# Patient Record
Sex: FEMALE | Race: BLACK OR AFRICAN AMERICAN | Employment: FULL TIME | URBAN - METROPOLITAN AREA
[De-identification: names, ages, dates, MRNs, and addresses within clinical notes are randomized per-mention and may not be internally consistent; named-entity substitution may affect disease eponyms.]

---

## 2017-02-07 ENCOUNTER — OFFICE VISIT (OUTPATIENT)
Dept: ENDOCRINOLOGY | Age: 37
End: 2017-02-07

## 2017-02-07 VITALS
SYSTOLIC BLOOD PRESSURE: 155 MMHG | BODY MASS INDEX: 43.32 KG/M2 | HEART RATE: 74 BPM | WEIGHT: 260 LBS | HEIGHT: 65 IN | DIASTOLIC BLOOD PRESSURE: 103 MMHG

## 2017-02-07 DIAGNOSIS — I10 BENIGN ESSENTIAL HTN: ICD-10-CM

## 2017-02-07 DIAGNOSIS — E11.9 TYPE 2 DIABETES MELLITUS WITHOUT COMPLICATION, WITHOUT LONG-TERM CURRENT USE OF INSULIN (HCC): Primary | ICD-10-CM

## 2017-02-07 RX ORDER — CARVEDILOL 25 MG/1
25 TABLET ORAL 2 TIMES DAILY WITH MEALS
Qty: 60 TAB | Refills: 10 | Status: SHIPPED | OUTPATIENT
Start: 2017-02-07 | End: 2017-05-08

## 2017-02-07 RX ORDER — AMLODIPINE AND VALSARTAN 5; 320 MG/1; MG/1
1 TABLET ORAL
Qty: 30 TAB | Refills: 10 | Status: SHIPPED | OUTPATIENT
Start: 2017-02-07 | End: 2017-05-08

## 2017-02-07 NOTE — PROGRESS NOTES
Chief Complaint   Patient presents with    Diabetes    New Patient     History of Present Illness: Feroz Posadas is a 39 y.o. female presents for evaluation of diabetes. she has had diabetes since 2015 - had gestational DM 3 years ago. Most recent A1c was 13.7 in summer     Complications: none    Current diabetes regimen:  Metformin ER 1000 mg twice daily - tolerating well now    Glucoses:  152 this AM fasting. 105 at noon one day and 112 after dinner    Diet  Was doing a very good job avoiding sugars, was having salads. Then started feeling a little shaky. Did not check glucose with sx. - Breakfast: coffee, sausage and egg with cheese biscuit  - Lunch: Subway or [employer] sandwich  - Dinner: salmon, shrimp, small amt of rice and mashed potatoes last night (leftovers from several nights).  'anything' - spaghetti, pizza   - Beverages:water, soda, diet beverages. - Snacks: chocolate chip cookies and bowl of cereal.     Exercise: none  Weight was as low as 190 at one point in her adult life when she was exercising often and eating better. 260 lb now. HTN:  Not controlled. Did not take BP medications yet this morning. Losartan/HCTZ 100/12.5, atenolol 100 mg. Social:  Has a 2 yo son. 1.5 hour commute each way. Works in [place] - works as an . Has a gym in her new office. Past Medical History   Diagnosis Date    Advanced care planning/counseling discussion 16    Diabetes ([address] 2015     A1C 15    Gestational diabetes     HTN (hypertension)     Hyperlipidemia 2015    Migraines      Past Surgical History   Procedure Laterality Date    Hx wisdom teeth extraction      Hx  section  2014    Hx gyn       Implant - Nexplanon     Current Outpatient Prescriptions   Medication Sig    metFORMIN ER (GLUCOPHAGE XR) 500 mg tablet One daily by mouth x 1 week, then 2 daily x 1wk, then 3 daily x 1 wk, up to 4 daily for diabetes.  (Patient taking differently: 1,000 mg two (2) times a day. One daily by mouth x 1 week, then 2 daily x 1wk, then 3 daily x 1 wk, up to 4 daily for diabetes.)    Lancets misc Please dispensed what is approved by her insurance company    Blood-Glucose Meter monitoring kit For up to 3 times a day glucose checks. One touch ultra II    glucose blood VI test strips (ASCENSIA AUTODISC VI, ONE TOUCH ULTRA TEST VI) strip For three times a day glucose checks. One Touch Ultra II    Insulin Needles, Disposable, 31 gauge x 5/16\" ndle For use with insulin pen    losartan-hydrochlorothiazide (HYZAAR) 100-12.5 mg per tablet Take 1 Tab by mouth daily.  atenolol (TENORMIN) 100 mg tablet Take 1 Tab by mouth daily. No current facility-administered medications for this visit. Allergies   Allergen Reactions    Ace Inhibitors Hives     Family History   Problem Relation Age of Onset    Diabetes Mother 39     on insulin now    Hypertension Father     High Cholesterol Father     Diabetes Sister 32     type 2    Diabetes Maternal Grandmother     Other Maternal Grandfather      MVA    Diabetes Paternal Grandmother     Cancer Paternal Grandfather      unknown     Social History     Social History    Marital status: SINGLE     Spouse name: N/A    Number of children: N/A    Years of education: N/A     Occupational History    Not on file.      Social History Main Topics    Smoking status: Current Every Day Smoker     Packs/day: 0.25    Smokeless tobacco: Never Used      Comment: reports she is cutting back    Alcohol use No    Drug use: No    Sexual activity: No     Other Topics Concern    Not on file     Social History Narrative     Review of Systems:  - Constitutional Symptoms: no fevers, chills, see HPI  - Eyes: no blurry vision or double vision  - Cardiovascular: no chest pain or palpitations  - Respiratory: no cough or shortness of breath  - Gastrointestinal: no dysphagia or abdominal pain  - Musculoskeletal: no joint pains or weakness  - Integumentary: no rashes  - Neurological: no numbness, tingling, or headaches  - Psychiatric: no depression or anxiety  - Endocrine: no heat or cold intolerance, no polyuria or polydipsia    Physical Examination:  Visit Vitals    BP (!) 155/103    Pulse 74    Ht 5' 5\" (1.651 m)    Wt 260 lb (117.9 kg)    BMI 43.27 kg/m2   -   - General: pleasant, no distress,   - HEENT:No scleral/conjunctival injection, EOMI,MMM  - Cardiovascular: regular, normal rate  - Respiratory: normal effort  - Integumentary: no edema  - Neurological: alert and oriented  - Psychiatric: normal mood and affect    Data Reviewed:   Component      Latest Ref Rng & Units 6/1/2016 10/26/2015 2/16/2015 2/16/2015           2:30 PM  4:03 PM  9:18 AM  9:18 AM   Glucose      65 - 99 mg/dL    114 (H)   BUN      6 - 20 mg/dL    12   Creatinine      0.57 - 1.00 mg/dL    0.69   GFR est non-AA      >59 mL/min/1.73    114   GFR est AA      >59 mL/min/1.73    131   BUN/Creatinine ratio      8 - 20    17   Sodium      134 - 144 mmol/L    138   Potassium      3.5 - 5.2 mmol/L    4.2   Chloride      97 - 108 mmol/L    100   CO2      18 - 29 mmol/L    26   Calcium      8.7 - 10.2 mg/dL    9.8   Protein, total      6.0 - 8.5 g/dL    6.7   Albumin      3.5 - 5.5 g/dL    4.4   GLOBULIN, TOTAL      1.5 - 4.5 g/dL    2.3   A-G Ratio      1.1 - 2.5    1.9   Bilirubin, total      0.0 - 1.2 mg/dL    0.4   Alk. phosphatase      39 - 117 IU/L    66   AST      0 - 40 IU/L    11   ALT      0 - 32 IU/L    13   Cholesterol, total      100 - 199 mg/dL   186    Triglyceride      0 - 149 mg/dL   102    HDL Cholesterol      >39 mg/dL   44    VLDL, calculated      5 - 40 mg/dL   20    LDL, calculated      0 - 99 mg/dL   122 (H)    Hemoglobin A1c, (calculated)      4.8 - 5.6 %  6.1 (H)     Hemoglobin A1c (POC)      % 13.7        Assessment/Plan:   1.  Type 2 diabetes mellitus without complication, without long-term current use of insulin [facility]   - Reviewed pathology of type II diabetes, including insulin resistance and progressive loss of beta cell function and insulin production with time. Explained the role of weight loss and limiting carbohydrate intake in affecting insulin needs. Reviewed basal and prandial insulin needs. Reviewed handout and gave basic directions on carbohydrate content of foods. Recommended limiting carbohydrate intake to < 45 g with meals. Encouraged exercise - 30 min 5 x weekly. - encouraged wt loss  - continue metformin ER  - encouraged her to avoid sugared beverages and work on snacking in evening as main areas for improvement  - she will also try to incorporate some exercise into her schedule. 2. Benign essential HTN   - prefer carvedilol over atenolol in diabetic patients and for BP, in general due to better metabolic effects and vasodilating properties. Stop atenolol and start carvedilol 25 mg BID. Reviewed chance for dizziness. May taper off carvedilol in future. - change losartan/HCTZ to amlodipine/valsartan on basis of ACCOMPLISH trial NE 2008. (ACE-I + amlodipine was superiore to ACE-I + HCTZ ). Start amlodipine/valsartan 5/320 mg and take at bedtime   3. BMI 40.0-44.9, adult ([address]   -  Encouraged improved dietary habits with lower glucose and carb intake. Avoid liquid calories, avoid snacking at night, decrease carb intake and aim for lean proteins and vegetables. - reviewed benefit for insulin sensitivity  - explained how energy, mood, sleep would all likely improve with wt loss     Greater than 50% of 45 minute visit was spent counseling the patient about above. Patient Instructions   Diabetes. Watch carbohydrate intake with meals and aim to keep this less than 45 grams per meal.    A Mediterranean style diet with 55% or less of calories from carbohydrates has been shown to be very helpful for people with diabetes.  This diet consists of vegetables, whole fruit, nuts, whole grains, beans, lentils, olive oil, fish, chicken, and less refined grains, red and processed meats. Exercise: work up to 30 minutes 5 days weekly of walking, or any other activity that gets your heart rate up. Medications:  Continue metformin  -1000 mg twice daily    Work on efforts to lose weight and increase exercise. Blood pressure:  Change atenolol to carvedilol 25 mg twice daily    Change losartan 100/12.5 to amlodipine/valsartan 5/320 mg - best taken in evening          Follow-up Disposition:  Return in about 3 months (around 5/7/2017).

## 2017-02-07 NOTE — PATIENT INSTRUCTIONS
Diabetes. Watch carbohydrate intake with meals and aim to keep this less than 45 grams per meal.    A Mediterranean style diet with 55% or less of calories from carbohydrates has been shown to be very helpful for people with diabetes. This diet consists of vegetables, whole fruit, nuts, whole grains, beans, lentils, olive oil, fish, chicken, and less refined grains, red and processed meats. Exercise: work up to 30 minutes 5 days weekly of walking, or any other activity that gets your heart rate up. Medications:  Continue metformin  -1000 mg twice daily    Work on efforts to lose weight and increase exercise.       Blood pressure:  Change atenolol to carvedilol 25 mg twice daily    Change losartan 100/12.5 to amlodipine/valsartan 5/320 mg - best taken in evening

## 2017-02-07 NOTE — MR AVS SNAPSHOT
Visit Information Date & Time Provider Department Dept. Phone Encounter #  
 2/7/2017  8:50 AM Denis Travis, [address] [place] [address] Diabetes and Endocrinology [phone] [id number] Upcoming Health Maintenance Date Due MICROALBUMIN Q1 8/12/1990 EYE EXAM RETINAL OR DILATED Q1 8/12/1990 LIPID PANEL Q1 2/16/2016 INFLUENZA AGE 9 TO ADULT 8/1/2016 HEMOGLOBIN A1C Q6M 12/1/2016 Pneumococcal 19-64 Medium Risk (1 of 1 - PPSV23) 6/1/2017* FOOT EXAM Q1 6/22/2017 PAP AKA CERVICAL CYTOLOGY 2/16/2018 DTaP/Tdap/Td series (2 - Td) 12/10/2023 *Topic was postponed. The date shown is not the original due date. Allergies as of 2/7/2017  Review Complete On: 2/7/2017 By: Denis Travis MD  
  
 Severity Noted Reaction Type Reactions Ace Inhibitors  04/07/2010    Hives Current Immunizations  Reviewed on 6/1/2016 Name Date Tdap 12/10/2013 Not reviewed this visit You Were Diagnosed With   
  
 Codes Comments Type 2 diabetes mellitus without complication, without long-term current use of insulin (HCC)    -  Primary ICD-10-CM: E11.9 ICD-9-CM: 250.00 Benign essential HTN     ICD-10-CM: I10 
ICD-9-CM: 401.1 BMI 40.0-44.9, adult [facility])     ICD-10-CM: Z68.41 
ICD-9-CM: V85.41 Vitals BP Pulse Height(growth percentile) Weight(growth percentile) BMI OB Status (!) 155/103 74 5' 5\" (1.651 m) 260 lb (117.9 kg) 43.27 kg/m2 Having regular periods Smoking Status Current Every Day Smoker Vitals History BMI and BSA Data Body Mass Index Body Surface Area  
 43.27 kg/m 2 2.33 m 2 Preferred Pharmacy Pharmacy Name Phone [id number] [address] [phone] Your Updated Medication List  
  
   
This list is accurate as of: 2/7/17 10:11 AM.  Always use your most recent med list. amLODIPine-valsartan 5-320 mg per tablet Commonly known as:  Harsh Quinones  
 Take 1 Tab by mouth nightly. Blood-Glucose Meter monitoring kit For up to 3 times a day glucose checks. One touch ultra II  
  
 carvedilol 25 mg tablet Commonly known as:  Lisa Pee Take 1 Tab by mouth two (2) times daily (with meals). Replaces atenolol  
  
 glucose blood VI test strips strip Commonly known as:  ASCENSIA AUTODISC VI, ONE TOUCH ULTRA TEST VI For three times a day glucose checks. One Touch Ultra II Insulin Needles (Disposable) 31 gauge x 5/16\" Ndle For use with insulin pen Lancets Misc Please dispensed what is approved by her insurance company  
  
 metFORMIN  mg tablet Commonly known as:  GLUCOPHAGE XR One daily by mouth x 1 week, then 2 daily x 1wk, then 3 daily x 1 wk, up to 4 daily for diabetes. Prescriptions Sent to Pharmacy Refills  
 amLODIPine-valsartan (EXFORGE) 5-320 mg per tablet 10 Sig: Take 1 Tab by mouth nightly. Class: Normal  
 Pharmacy: [facility] Ph #: [phone] Route: Oral  
 carvedilol (COREG) 25 mg tablet 10 Sig: Take 1 Tab by mouth two (2) times daily (with meals). Replaces atenolol Class: Normal  
 Pharmacy: [facility] Ph #: [phone] Route: Oral  
  
We Performed the Following HEMOGLOBIN A1C WITH EAG [33728 CPT(R)] METABOLIC PANEL, COMPREHENSIVE [43254 CPT(R)] MICROALBUMIN, UR, RAND W/ MICROALBUMIN/CREA RATIO [id number] CPT(R)] TSH 3RD GENERATION [59308 CPT(R)] Patient Instructions Diabetes. Watch carbohydrate intake with meals and aim to keep this less than 45 grams per meal. 
 
A Mediterranean style diet with 55% or less of calories from carbohydrates has been shown to be very helpful for people with diabetes.  This diet consists of vegetables, whole fruit, nuts, whole grains, beans, lentils, olive oil, fish, chicken, and less refined grains, red and processed meats.  
 
Exercise: work up to 30 minutes 5 days weekly of walking, or any other activity that gets your heart rate up. Medications: 
Continue metformin  -1000 mg twice daily Work on efforts to lose weight and increase exercise. Blood pressure: 
Change atenolol to carvedilol 25 mg twice daily Change losartan 100/12.5 to amlodipine/valsartan 5/320 mg - best taken in evening Introducing [facility] SERVICES! Manuel Nicholson introduces [employer] patient portal. Now you can access parts of your medical record, email your doctor's office, and request medication refills online. 1. In your internet browser, go to [email or web address] 2. Click on the First Time User? Click Here link in the Sign In box. You will see the New Member Sign Up page. 3. Enter your [employer] Access Code exactly as it appears below. You will not need to use this code after youve completed the sign-up process. If you do not sign up before the expiration date, you must request a new code. · [employer] Access Code: [email or web address] Expires: 5/8/2017 10:11 AM 
 
4. Enter the last four digits of your Social Security Number (xxxx) and Date of Birth (mm/dd/yyyy) as indicated and click Submit. You will be taken to the next sign-up page. 5. Create a [employer] ID. This will be your [employer] login ID and cannot be changed, so think of one that is secure and easy to remember. 6. Create a [employer] password. You can change your password at any time. 7. Enter your Password Reset Question and Answer. This can be used at a later time if you forget your password. 8. Enter your e-mail address. You will receive e-mail notification when new information is available in [address] 9. Click Sign Up. You can now view and download portions of your medical record. 10. Click the Download Summary menu link to download a portable copy of your medical information. If you have questions, please visit the Frequently Asked Questions section of the [employer] website. Remember, [address] is NOT to be used for urgent needs. For medical emergencies, dial 911. Now available from your iPhone and Android! Please provide this summary of care documentation to your next provider. Your primary care clinician is listed as John Simpson. If you have any questions after today's visit, please call [phone].

## 2017-02-09 LAB
ALBUMIN SERPL-MCNC: 4.6 G/DL (ref 3.5–5.5)
ALBUMIN/CREAT UR: 24.6 MG/G CREAT (ref 0–30)
ALBUMIN/GLOB SERPL: 1.6 {RATIO} (ref 1.1–2.5)
ALP SERPL-CCNC: 53 IU/L (ref 39–117)
ALT SERPL-CCNC: 26 IU/L (ref 0–32)
AST SERPL-CCNC: 18 IU/L (ref 0–40)
BILIRUB SERPL-MCNC: 0.3 MG/DL (ref 0–1.2)
BUN SERPL-MCNC: 11 MG/DL (ref 6–20)
BUN/CREAT SERPL: 16 (ref 8–20)
CALCIUM SERPL-MCNC: 9.9 MG/DL (ref 8.7–10.2)
CHLORIDE SERPL-SCNC: 102 MMOL/L (ref 96–106)
CO2 SERPL-SCNC: 21 MMOL/L (ref 18–29)
CREAT SERPL-MCNC: 0.67 MG/DL (ref 0.57–1)
CREAT UR-MCNC: 200 MG/DL
EST. AVERAGE GLUCOSE BLD GHB EST-MCNC: 117 MG/DL
GLOBULIN SER CALC-MCNC: 2.8 G/DL (ref 1.5–4.5)
GLUCOSE SERPL-MCNC: 99 MG/DL (ref 65–99)
HBA1C MFR BLD: 5.7 % (ref 4.8–5.6)
MICROALBUMIN UR-MCNC: 49.1 UG/ML
POTASSIUM SERPL-SCNC: 4.2 MMOL/L (ref 3.5–5.2)
PROT SERPL-MCNC: 7.4 G/DL (ref 6–8.5)
SODIUM SERPL-SCNC: 140 MMOL/L (ref 134–144)
TSH SERPL DL<=0.005 MIU/L-ACNC: 0.82 UIU/ML (ref 0.45–4.5)

## 2017-02-11 NOTE — PROGRESS NOTES
Will send mychart message. Labs overall look very good. Will encourage efforts with diet and weight loss. Kwesi Castillo,  Please call and mail lab letter. Thank you. Please mail pre-labs - A1c, lipid profile to be done prior to next visit.

## 2017-02-21 ENCOUNTER — TELEPHONE (OUTPATIENT)
Dept: ENDOCRINOLOGY | Age: 37
End: 2017-02-21

## 2017-02-21 NOTE — TELEPHONE ENCOUNTER
----- Message from Consuelo Alanis sent at 2/21/2017  8:46 AM EST -----  Regarding: FW: Louie/telephone  2/21/2017  8:46 AM    Good Morning [facility]! Please see message below! Thanks Vandana Pereira.  ----- Message -----     From: Jackieann Anderson     Sent: 2/21/2017   8:18 AM       To: [facility] Office Pool  Subject: Louie/telephone                                   Pt stated she started a new BP medication and her chest hurts on the right side. She is not sure if she should take the medication today. Pts number is [phone]. She stated she did not take the medication and she feels better.

## 2017-02-21 NOTE — TELEPHONE ENCOUNTER
I called patient and read Dr. Brasher Session recommendation and she voiced understanding. Patient stated she will decrease to 1/2 tablet of amlodipine/valsartan and will call us back with an update of her BP and heart rate.

## 2017-02-21 NOTE — TELEPHONE ENCOUNTER
I returned patient's call. She called to report right sided chest pain that started when she began taking amlodipine/valsartan. She takes amlodipine/valsartan in the evening, and carvedilol twice daily. She denies shortness of breath, cough, swelling. She has not checked her blood pressure. She would like to discontinue amlodipine/valsartan and resume losartan. Please advise.

## 2017-05-08 ENCOUNTER — OFFICE VISIT (OUTPATIENT)
Dept: ENDOCRINOLOGY | Age: 37
End: 2017-05-08

## 2017-05-08 VITALS
BODY MASS INDEX: 43.49 KG/M2 | HEART RATE: 88 BPM | SYSTOLIC BLOOD PRESSURE: 145 MMHG | DIASTOLIC BLOOD PRESSURE: 75 MMHG | WEIGHT: 261 LBS | HEIGHT: 65 IN

## 2017-05-08 DIAGNOSIS — I10 HTN (HYPERTENSION), BENIGN: ICD-10-CM

## 2017-05-08 LAB — GLUCOSE POC: 225 MG/DL

## 2017-05-08 RX ORDER — LOSARTAN POTASSIUM AND HYDROCHLOROTHIAZIDE 12.5; 1 MG/1; MG/1
1 TABLET ORAL DAILY
COMMUNITY
End: 2017-05-08 | Stop reason: ALTCHOICE

## 2017-05-08 RX ORDER — AMLODIPINE AND VALSARTAN 5; 320 MG/1; MG/1
1 TABLET ORAL DAILY
Qty: 30 TAB | Refills: 10 | Status: SHIPPED | OUTPATIENT
Start: 2017-05-08 | End: 2018-01-18 | Stop reason: SDUPTHER

## 2017-05-08 RX ORDER — ATENOLOL 100 MG/1
100 TABLET ORAL DAILY
COMMUNITY
End: 2017-05-09 | Stop reason: SDUPTHER

## 2017-05-08 NOTE — PATIENT INSTRUCTIONS
Diabetes. Continue efforts to decrease snacking in particular  Work on efforts to decrease processed food intake and eat more vegetables, beans/lentils, nuts, fruit, fish, chicken, etc    Medications:  Continue metformin  -1000 mg twice daily    Work on efforts to lose weight and increase exercise.       Blood pressure:  Continue atenolol 100 mg -   Change losartan/HCTZ to valsartan/amlodipine 320/5 mg daily    Weight:  - dietary and exercise efforts  - Belviq 10 mg in AM and 10 mg in mid afternoon

## 2017-05-08 NOTE — MR AVS SNAPSHOT
Visit Information Date & Time Provider Department Dept. Phone Encounter #  
 5/8/2017  9:10 AM Lei Cuellar, [address] [facility] Diabetes and Endocrinology [phone] Follow-up Instructions Return in about 3 months (around 8/8/2017). Upcoming Health Maintenance Date Due  
 EYE EXAM RETINAL OR DILATED Q1 8/12/1990 LIPID PANEL Q1 2/16/2016 Pneumococcal 19-64 Medium Risk (1 of 1 - PPSV23) 6/1/2017* FOOT EXAM Q1 6/22/2017 INFLUENZA AGE 9 TO ADULT 8/1/2017 HEMOGLOBIN A1C Q6M 8/7/2017 MICROALBUMIN Q1 2/7/2018 PAP AKA CERVICAL CYTOLOGY 2/16/2018 DTaP/Tdap/Td series (2 - Td) 12/10/2023 *Topic was postponed. The date shown is not the original due date. Allergies as of 5/8/2017  Review Complete On: 5/8/2017 By: Lei Cuellar MD  
  
 Severity Noted Reaction Type Reactions Ace Inhibitors  04/07/2010    Hives Current Immunizations  Reviewed on 6/1/2016 Name Date Tdap 12/10/2013 Not reviewed this visit You Were Diagnosed With   
  
 Codes Comments Uncontrolled diabetes mellitus type 2 without complications, unspecified long term insulin use status [facility]    -  Primary ICD-10-CM: E11.65 ICD-9-CM: 250.02 BMI 40.0-44.9, adult (HCC)     ICD-10-CM: Z68.41 
ICD-9-CM: V85.41   
 HTN (hypertension), benign     ICD-10-CM: I10 
ICD-9-CM: 401.1 Vitals BP Pulse Height(growth percentile) Weight(growth percentile) BMI OB Status 145/75 88 5' 5\" (1.651 m) 261 lb (118.4 kg) 43.43 kg/m2 Having regular periods Smoking Status Current Every Day Smoker Vitals History BMI and BSA Data Body Mass Index Body Surface Area  
 43.43 kg/m 2 2.33 m 2 Preferred Pharmacy Pharmacy Name Phone [id number] Antonina White, [address] [facility] [phone] Your Updated Medication List  
  
   
This list is accurate as of: 5/8/17 10:22 AM.  Always use your most recent med list.  
  
  
  
  
 atenolol 100 mg tablet Commonly known as:  TENORMIN Take 100 mg by mouth daily. Blood-Glucose Meter monitoring kit For up to 3 times a day glucose checks. One touch ultra II  
  
 glucose blood VI test strips strip Commonly known as:  ASCENSIA AUTODISC VI, ONE TOUCH ULTRA TEST VI For three times a day glucose checks. One Touch Ultra II Insulin Needles (Disposable) 31 gauge x 5/16\" Ndle For use with insulin pen Lancets Misc Please dispensed what is approved by her insurance company  
  
 lorcaserin 10 mg Tab Commonly known as:  Huber Ulises Take 10 mg by mouth two (2) times a day. Max Daily Amount: 20 mg.  
  
 losartan-hydroCHLOROthiazide 100-12.5 mg per tablet Commonly known as:  HYZAAR Take 1 Tab by mouth daily. metFORMIN  mg tablet Commonly known as:  GLUCOPHAGE XR One daily by mouth x 1 week, then 2 daily x 1wk, then 3 daily x 1 wk, up to 4 daily for diabetes. Prescriptions Printed Refills  
 lorcaserin (BELVIQ) 10 mg tab 5 Sig: Take 10 mg by mouth two (2) times a day. Max Daily Amount: 20 mg.  
 Class: Print Route: Oral  
  
We Performed the Following AMB POC GLUCOSE, QUANTITATIVE, BLOOD [46537 CPT(R)] HEMOGLOBIN A1C WITH EAG [69214 CPT(R)] LIPID PANEL [15113 CPT(R)] METABOLIC PANEL, COMPREHENSIVE [62910 CPT(R)] Follow-up Instructions Return in about 3 months (around 8/8/2017). Patient Instructions Diabetes. Continue efforts to decrease snacking in particular Work on efforts to decrease processed food intake and eat more vegetables, beans/lentils, nuts, fruit, fish, chicken, etc 
 
Medications: 
Continue metformin  -1000 mg twice daily Work on efforts to lose weight and increase exercise. Blood pressure: 
Continue atenolol 100 mg - Change losartan/HCTZ to valsartan/amlodipine 320/5 mg daily Weight: 
- dietary and exercise efforts - Belviq 10 mg in AM and 10 mg in mid afternoon Introducing [facility] & HEALTH SERVICES! [employer] introduces [employer] patient portal. Now you can access parts of your medical record, email your doctor's office, and request medication refills online. 1. In your internet browser, go to [email or web address] 2. Click on the First Time User? Click Here link in the Sign In box. You will see the New Member Sign Up page. 3. Enter your [employer] Access Code exactly as it appears below. You will not need to use this code after youve completed the sign-up process. If you do not sign up before the expiration date, you must request a new code. · [employer] Access Code: [email or web address] Expires: 5/8/2017 11:11 AM 
 
4. Enter the last four digits of your Social Security Number (xxxx) and Date of Birth (mm/dd/yyyy) as indicated and click Submit. You will be taken to the next sign-up page. 5. Create a [employer] ID. This will be your [employer] login ID and cannot be changed, so think of one that is secure and easy to remember. 6. Create a [employer] password. You can change your password at any time. 7. Enter your Password Reset Question and Answer. This can be used at a later time if you forget your password. 8. Enter your e-mail address. You will receive e-mail notification when new information is available in [address] 9. Click Sign Up. You can now view and download portions of your medical record. 10. Click the Download Summary menu link to download a portable copy of your medical information. If you have questions, please visit the Frequently Asked Questions section of the [employer] website. Remember, [employer] is NOT to be used for urgent needs. For medical emergencies, dial 911. Now available from your iPhone and Android! Please provide this summary of care documentation to your next provider. If you have any questions after today's visit, please call [phone].

## 2017-05-08 NOTE — PROGRESS NOTES
History of Present Illness: Mauro Quiros is a 39 y.o. female presents for follow-up of diabetes. she has had diabetes since June 2015 - had gestational DM 3 years ago. Most  A1c was 13.7 in summer 2015, then 6.1 in late 2015 and 5.7 in 2/2016. Complications: none    Current diabetes regimen:  Metformin ER 1000 mg twice daily - tolerating well now    Glucoses:  As low as 85 to as high as 150-160. Typically was checking in AM. Has not checked much in last few weeks     Diet  Trying to decrease carb intake. Having more grilled. Had a donut his AM trying to rush and get her on time. Exercise: trying to get outside more with son and be more active. HTN:  Losartan/HCTZ 100/12.5, atenolol 100 mg. Did not tolerate change to amlodipine/valsartan + carvedilol. Said this change made it feel like she was having chest pain/discomfort. BMI 43 -asks about weight loss medications    Social:  Has a 2 yo son. Past Medical History:   Diagnosis Date    Advanced care planning/counseling discussion 6/1/16    Diabetes ([address] 6/2015    A1C 13    Gestational diabetes     HTN (hypertension)     Hyperlipidemia 1/2015    Migraines      Current Outpatient Prescriptions   Medication Sig    losartan-hydroCHLOROthiazide (HYZAAR) 100-12.5 mg per tablet Take 1 Tab by mouth daily.  atenolol (TENORMIN) 100 mg tablet Take 100 mg by mouth daily.  amLODIPine-valsartan (EXFORGE) 5-320 mg per tablet Take 1 Tab by mouth nightly.  metFORMIN ER (GLUCOPHAGE XR) 500 mg tablet One daily by mouth x 1 week, then 2 daily x 1wk, then 3 daily x 1 wk, up to 4 daily for diabetes. (Patient taking differently: 1,000 mg two (2) times a day. One daily by mouth x 1 week, then 2 daily x 1wk, then 3 daily x 1 wk, up to 4 daily for diabetes.)    Lancets misc Please dispensed what is approved by her insurance company    Blood-Glucose Meter monitoring kit For up to 3 times a day glucose checks.  One touch ultra II    glucose blood VI test strips (ASCENSIA AUTODISC VI, ONE TOUCH ULTRA TEST VI) strip For three times a day glucose checks. One Touch Ultra II    Insulin Needles, Disposable, 31 gauge x 5/16\" ndle For use with insulin pen    carvedilol (COREG) 25 mg tablet Take 1 Tab by mouth two (2) times daily (with meals). Replaces atenolol     No current facility-administered medications for this visit. Allergies   Allergen Reactions    Ace Inhibitors Hives       Review of Systems:  - Eyes: no blurry vision or double vision  - Cardiovascular: see HPI - chest pain was short-lasting, sharp and not exertional  - Respiratory: no shortness of breath  - Musculoskeletal: no myalgias  - Neurological: no numbness/tingling in extremities    Physical Examination:  Visit Vitals    /75    Pulse 88    Ht 5' 5\" (1.651 m)    Wt 261 lb (118.4 kg)    BMI 43.43 kg/m2   -   - General: pleasant, no distress, normal gait   HEENT: hearing intact, EOMI, clear sclera without icterus  - Cardiovascular: regular, normal rate   - Respiratory: normal effort  - Integumentary: no edema  - Psychiatric: normal mood and affect    Data Reviewed:   Component      Latest Ref Rng & Units 5/8/2017 2/7/2017 2/7/2017 2/7/2017           9:46 AM 10:50 AM 10:50 AM 10:50 AM   Glucose      65 - 99 mg/dL    99   BUN      6 - 20 mg/dL    11   Creatinine      0.57 - 1.00 mg/dL    0.67   GFR est non-AA      >59 mL/min/1.73    113   GFR est AA      >59 mL/min/1.73    131   BUN/Creatinine ratio      8 - 20    16   Sodium      134 - 144 mmol/L    140   Potassium      3.5 - 5.2 mmol/L    4.2   Chloride      96 - 106 mmol/L    102   CO2      18 - 29 mmol/L    21   Calcium      8.7 - 10.2 mg/dL    9.9   Protein, total      6.0 - 8.5 g/dL    7.4   Albumin      3.5 - 5.5 g/dL    4.6   GLOBULIN, TOTAL      1.5 - 4.5 g/dL    2.8   A-G Ratio      1.1 - 2.5    1.6   Bilirubin, total      0.0 - 1.2 mg/dL    0.3   Alk.  phosphatase      39 - 117 IU/L    53   AST      0 - 40 IU/L    18 ALT      0 - 32 IU/L    26   Creatinine, urine      Not Estab. mg/dL       Microalbumin, urine      Not Estab. ug/mL       Microalbumin/Creat. Ratio      0.0 - 30.0 mg/g creat       Hemoglobin A1c, (calculated)      4.8 - 5.6 %   5.7 (H)    Estimated average glucose      mg/dL   117    TSH      0.450 - 4.500 uIU/mL  0.816     GLUCOSE,FAST - POC      mg/dL 225        Component      Latest Ref Rng & Units 2/7/2017          10:50 AM   Glucose      65 - 99 mg/dL    BUN      6 - 20 mg/dL    Creatinine      0.57 - 1.00 mg/dL    GFR est non-AA      >59 mL/min/1.73    GFR est AA      >59 mL/min/1.73    BUN/Creatinine ratio      8 - 20    Sodium      134 - 144 mmol/L    Potassium      3.5 - 5.2 mmol/L    Chloride      96 - 106 mmol/L    CO2      18 - 29 mmol/L    Calcium      8.7 - 10.2 mg/dL    Protein, total      6.0 - 8.5 g/dL    Albumin      3.5 - 5.5 g/dL    GLOBULIN, TOTAL      1.5 - 4.5 g/dL    A-G Ratio      1.1 - 2.5    Bilirubin, total      0.0 - 1.2 mg/dL    Alk. phosphatase      39 - 117 IU/L    AST      0 - 40 IU/L    ALT      0 - 32 IU/L    Creatinine, urine      Not Estab. mg/dL 200.0   Microalbumin, urine      Not Estab. ug/mL 49.1   Microalbumin/Creat. Ratio      0.0 - 30.0 mg/g creat 24.6   Hemoglobin A1c, (calculated)      4.8 - 5.6 %    Estimated average glucose      mg/dL    TSH      0.450 - 4.500 uIU/mL    GLUCOSE,FAST - POC      mg/dL        Assessment/Plan:   1. diabetes mellitus type 2 without complications, unspecified long term insulin use status ([facility]   - appears controlled  - continue metformin  - labs. - encouraged dietary efforts and wt loss   2. BMI 40.0-44.9, adult (HCC)   - start Belviq 10 mg BID. Reviewed other options. Appears her insurance does not cover wt loss medications. Discussed phentermine + topiramate as cheapest option. She was worried about side effect of nervousness. 3. HTN (hypertension), benign   - not controlled  - change losartan//HCTZ to amlodipine/Valsartan. Continue atenolol for now. Encouraged wt loss. Patient Instructions   Diabetes. Continue efforts to decrease snacking in particular  Work on efforts to decrease processed food intake and eat more vegetables, beans/lentils, nuts, fruit, fish, chicken, etc    Medications:  Continue metformin  -1000 mg twice daily    Work on efforts to lose weight and increase exercise. Blood pressure:  Continue atenolol 100 mg -   Change losartan/HCTZ to valsartan/amlodipine 320/5 mg daily    Weight:  - dietary and exercise efforts  - Belviq 10 mg in AM and 10 mg in mid afternoon        Follow-up Disposition:  Return in about 3 months (around 8/8/2017).     Copy sent to:

## 2017-05-09 RX ORDER — ATENOLOL 100 MG/1
100 TABLET ORAL DAILY
Qty: 30 TAB | Refills: 10 | Status: SHIPPED | OUTPATIENT
Start: 2017-05-09 | End: 2017-06-12 | Stop reason: SDUPTHER

## 2017-05-09 RX ORDER — ATENOLOL 100 MG/1
100 TABLET ORAL DAILY
Qty: 90 TAB | Refills: 0 | OUTPATIENT
Start: 2017-05-09

## 2017-05-09 NOTE — TELEPHONE ENCOUNTER
----- Message from Óscar Bobby sent at 5/9/2017 10:53 AM EDT -----  Regarding: Dr. Tonny Duarte  Pt would like refill for \"Apenonol\" [facility]mg [facility] is on [facility].  Best contact [phone]

## 2017-06-12 RX ORDER — ATENOLOL 100 MG/1
100 TABLET ORAL DAILY
Qty: 30 TAB | Refills: 10 | Status: SHIPPED | OUTPATIENT
Start: 2017-06-12 | End: 2017-07-10 | Stop reason: SDUPTHER

## 2017-06-12 NOTE — TELEPHONE ENCOUNTER
From: Consuelo Manzano  To: Avery Rangel MD  Sent: 6/12/2017 12:35 PM EDT  Subject: Medication Renewal Request    Original authorizing provider: MD Consuelo Self would like a refill of the following medications:  atenolol (TENORMIN) 100 mg tablet Avery Rangel MD]    [address]     Comment:  Could you please send in a new prescription for the Atenolol to [facility] [address] [address], phone # [phone]. I have been trying to get this prescription filled at my usual [facility] on [address]. I was told they had to order the medication. 3 days later, they still didnt have it. I then took the medication to the local [facility] to have it transferred and filled there and they cannot get in contact with [facility]'s pharmacist to approve the transfer. Thanks.

## 2017-07-10 RX ORDER — ATENOLOL 100 MG/1
100 TABLET ORAL DAILY
Qty: 30 TAB | Refills: 2 | Status: SHIPPED | OUTPATIENT
Start: 2017-07-10 | End: 2018-01-18

## 2017-07-10 NOTE — TELEPHONE ENCOUNTER
She needs to set up appt to meet Dr Olivia Dawson with next open new patient slot. She sees Dr Kaylyn Egan for DM care.  Refill on Metformin needs to go to his office

## 2017-07-11 ENCOUNTER — TELEPHONE (OUTPATIENT)
Dept: ENDOCRINOLOGY | Age: 37
End: 2017-07-11

## 2017-07-11 RX ORDER — METFORMIN HYDROCHLORIDE 500 MG/1
TABLET, EXTENDED RELEASE ORAL
Qty: 120 TAB | Refills: 12 | Status: SHIPPED | OUTPATIENT
Start: 2017-07-11 | End: 2018-07-30 | Stop reason: SDUPTHER

## 2018-01-18 ENCOUNTER — OFFICE VISIT (OUTPATIENT)
Dept: ENDOCRINOLOGY | Age: 38
End: 2018-01-18

## 2018-01-18 VITALS
SYSTOLIC BLOOD PRESSURE: 165 MMHG | WEIGHT: 264 LBS | BODY MASS INDEX: 43.99 KG/M2 | HEART RATE: 72 BPM | DIASTOLIC BLOOD PRESSURE: 98 MMHG | HEIGHT: 65 IN

## 2018-01-18 DIAGNOSIS — E11.9 TYPE 2 DIABETES MELLITUS WITHOUT COMPLICATION, WITHOUT LONG-TERM CURRENT USE OF INSULIN (HCC): Primary | ICD-10-CM

## 2018-01-18 DIAGNOSIS — I10 HTN (HYPERTENSION), BENIGN: ICD-10-CM

## 2018-01-18 RX ORDER — INDAPAMIDE 2.5 MG/1
2.5 TABLET, FILM COATED ORAL DAILY
Qty: 30 TAB | Refills: 10 | Status: SHIPPED | OUTPATIENT
Start: 2018-01-18 | End: 2019-01-14 | Stop reason: SDUPTHER

## 2018-01-18 RX ORDER — ATENOLOL 50 MG/1
50 TABLET ORAL DAILY
Qty: 30 TAB | Refills: 10 | Status: SHIPPED | OUTPATIENT
Start: 2018-01-18 | End: 2018-11-05 | Stop reason: ALTCHOICE

## 2018-01-18 RX ORDER — AMLODIPINE AND VALSARTAN 5; 320 MG/1; MG/1
1 TABLET ORAL DAILY
Qty: 30 TAB | Refills: 10 | Status: SHIPPED | OUTPATIENT
Start: 2018-01-18 | End: 2018-02-15 | Stop reason: SDUPTHER

## 2018-01-18 NOTE — PROGRESS NOTES
History of Present Illness: Holly Morrow is a 40 y.o. female presents for follow-up of diabetes and BMI 37  female presents for follow-up of diabetes. she has had diabetes since June 2015 - had gestational DM in 2012. .  A1c was 13.7 in summer 2015, then 6.1 in late 2015 and 5.7 in 2/0237.      Complications: none     Current diabetes regimen:  Metformin ER 1000 mg twice daily - tolerating well now     Glucoses:  Not monitoring.      Diet  Breakfast: cheese stick or oatmeal  Lunch:tostatda, salad  Dinner: pasta. Beverages: Sprite - 1-2 cans/day. Juice or water. Sweetened tea.      Exercise: trying to get outside more with son and be more active.       HTN:    Amlodipine/valsartan  Atenolol   - BP higher     BMI 43 -asks about weight loss medications. Weight is up about 24 pounds from low 18 months ago.     Social:  Has a 2 yo son. Finances are tight due to need for new car and new refrigerator. Past Medical History:   Diagnosis Date    Advanced care planning/counseling discussion 6/1/16    Diabetes ([address] 6/2015    A1C 13    Gestational diabetes     HTN (hypertension)     Hyperlipidemia 1/2015    Migraines      Current Outpatient Prescriptions   Medication Sig    atenolol (TENORMIN) 25 mg tablet TAKE 4 TABLETS BY MOUTH EVERY DAY    metFORMIN ER (GLUCOPHAGE XR) 500 mg tablet Take 2 tablets twice daily after meals.  amLODIPine-valsartan (EXFORGE) 5-320 mg per tablet Take 1 Tab by mouth daily. Replaces losartan/HCTZ. For blood pressure    Lancets misc Please dispensed what is approved by her insurance company    Blood-Glucose Meter monitoring kit For up to 3 times a day glucose checks. One touch ultra II    glucose blood VI test strips (ASCENSIA AUTODISC VI, ONE TOUCH ULTRA TEST VI) strip For three times a day glucose checks. One Touch Ultra II    Insulin Needles, Disposable, 31 gauge x 5/16\" ndle For use with insulin pen     No current facility-administered medications for this visit. Allergies   Allergen Reactions    Ace Inhibitors Hives       Review of Systems:  - Eyes: no blurry vision or double vision  - Cardiovascular: no chest pain  - Respiratory: no shortness of breath  - Musculoskeletal: no myalgias  - Neurological: no numbness/tingling in extremities    Physical Examination:  Visit Vitals    BP (!) 165/98    Pulse 72    Ht 5' 5\" (1.651 m)    Wt 264 lb (119.7 kg)    BMI 43.93 kg/m2   -   - General: pleasant, no distress, normal gait   HEENT: hearing intact, EOMI, clear sclera without icterus  - Cardiovascular: regular, normal rate   - Respiratory: normal effort  - Integumentary: no edema  - Psychiatric: normal mood and affect    Data Reviewed:     Component      Latest Ref Rng & Units 2/7/2017 2/7/2017 2/7/2017 2/7/2017          10:50 AM 10:50 AM 10:50 AM 10:50 AM   Glucose      65 - 99 mg/dL   99    BUN      6 - 20 mg/dL   11    Creatinine      0.57 - 1.00 mg/dL   0.67    GFR est non-AA      >59 mL/min/1.73   113    GFR est AA      >59 mL/min/1.73   131    BUN/Creatinine ratio      8 - 20   16    Sodium      134 - 144 mmol/L   140    Potassium      3.5 - 5.2 mmol/L   4.2    Chloride      96 - 106 mmol/L   102    CO2      18 - 29 mmol/L   21    Calcium      8.7 - 10.2 mg/dL   9.9    Protein, total      6.0 - 8.5 g/dL   7.4    Albumin      3.5 - 5.5 g/dL   4.6    GLOBULIN, TOTAL      1.5 - 4.5 g/dL   2.8    A-G Ratio      1.1 - 2.5   1.6    Bilirubin, total      0.0 - 1.2 mg/dL   0.3    Alk. phosphatase      39 - 117 IU/L   53    AST      0 - 40 IU/L   18    ALT (SGPT)      0 - 32 IU/L   26    Creatinine, urine      Not Estab. mg/dL    200.0   Microalbumin, urine      Not Estab. ug/mL    49.1   Microalbumin/Creat. Ratio      0.0 - 30.0 mg/g creat    24.6   Hemoglobin A1c, (calculated)      4.8 - 5.6 %  5.7 (H)     Estimated average glucose      mg/dL  117     TSH      0.450 - 4.500 uIU/mL 0.816        Assessment/Plan:   1.  Type 2 diabetes mellitus without complication, without long-term current use of insulin ([facility]   Encouraged efforts with diet and weight loss. Specifically, recommended she avoid sugared beverages and juices. Additionally, recommend she eat a very small, early dinner and avoid snacking afterwards. Continue metformin   2. HTN (hypertension), benign   Not controlled  Continue atenolol as 50 mg once daily  Continue amlodipine/valsartan  Add indapamide 2.5 mg daily. 3. BMI 40.0-44.9, adult ([facility]   Due to how weight loss would dramatically help diabetes. See above for recommendations to decrease calories and sugar from beverage intake and to improve food consumption the evening. I feel she is able to successfully make these changes, she should lose a considerable amount of weight. Encouraged a target weight of 250 pounds in 3 months. Patient Instructions   Diabetes and weight. Work on efforts to decrease processed food intake and eat more vegetables, beans/lentils, nuts, fruit, fish, chicken, etc  ** No more sugared beverages or juices. Water, unsweet tea. ** recommend not eating carbohydrates after 5 pm    Medications:  Continue metformin  -1000 mg twice daily    Work on efforts to lose weight and increase exercise. Blood pressure:  Continue atenolol -> decrease to 50 mg   Continue valsartan/amlodipine 320/5 mg daily  Add indapamide 2.5 mg daily    Work on weight loss            Follow-up Disposition:  Return in about 3 months (around 4/18/2018).     Copy sent to:

## 2018-01-18 NOTE — MR AVS SNAPSHOT
[address] [facility] 
[phone] Patient: Javi Manzano MRN:  MYRNA:[id number] Visit Information Date & Time Provider Department Dept. Phone Encounter #  
 1/18/2018 11:30 AM Vena Age, [address] [facility] Diabetes and Endocrinology [phone] Follow-up Instructions Return in about 3 months (around 4/18/2018). Your Appointments 2/20/2018  1:00 PM  
New Patient with Amari Gu MD  
[facility] [address] [facility]) Appt Note: np est pcp c/p 0  cpe  
 [facility] [address] [phone]  
[facility] Upcoming Health Maintenance Date Due  
 EYE EXAM RETINAL OR DILATED Q1 8/12/1990 Pneumococcal 19-64 Medium Risk (1 of 1 - PPSV23) 8/12/1999 LIPID PANEL Q1 2/16/2016 FOOT EXAM Q1 6/22/2017 Influenza Age 5 to Adult 8/1/2017 HEMOGLOBIN A1C Q6M 8/7/2017 MICROALBUMIN Q1 2/7/2018 PAP AKA CERVICAL CYTOLOGY 2/16/2018 DTaP/Tdap/Td series (2 - Td) 12/10/2023 Allergies as of 1/18/2018  Review Complete On: 1/18/2018 By: Heather Tolentino LPN Severity Noted Reaction Type Reactions Ace Inhibitors  04/07/2010    Hives Current Immunizations  Reviewed on 6/1/2016 Name Date Tdap 12/10/2013 Not reviewed this visit You Were Diagnosed With   
  
 Codes Comments Type 2 diabetes mellitus without complication, without long-term current use of insulin (HCC)    -  Primary ICD-10-CM: E11.9 ICD-9-CM: 250.00 HTN (hypertension), benign     ICD-10-CM: I10 
ICD-9-CM: 401.1 BMI 40.0-44.9, adult [facility])     ICD-10-CM: Z68.41 
ICD-9-CM: V85.41 Vitals BP Pulse Height(growth percentile) Weight(growth percentile) BMI OB Status (!) 165/98 72 5' 5\" (1.651 m) 264 lb (119.7 kg) 43.93 kg/m2 Having regular periods Smoking Status Current Every Day Smoker Vitals History BMI and BSA Data Body Mass Index Body Surface Area  
 43.93 kg/m 2 2.34 m 2 Preferred Pharmacy Pharmacy Name Phone [facility] [address]  [place] [phone] Your Updated Medication List  
  
   
This list is accurate as of: 1/18/18 12:37 PM.  Always use your most recent med list. amLODIPine-valsartan 5-320 mg per tablet Commonly known as:  Emile Don Take 1 Tab by mouth daily. Replaces losartan/HCTZ. For blood pressure  
  
 atenolol 50 mg tablet Commonly known as:  TENORMIN Take 1 Tab by mouth daily. Blood-Glucose Meter monitoring kit For up to 3 times a day glucose checks. One touch ultra II  
  
 glucose blood VI test strips strip Commonly known as:  ASCENSIA AUTODISC VI, ONE TOUCH ULTRA TEST VI For three times a day glucose checks. One Touch Ultra II  
  
 indapamide 2.5 mg tablet Commonly known as:  Kim Baldwin Take 1 Tab by mouth daily. For blood pressure Insulin Needles (Disposable) 31 gauge x 5/16\" Ndle For use with insulin pen Lancets Misc Please dispensed what is approved by her insurance company  
  
 metFORMIN  mg tablet Commonly known as:  GLUCOPHAGE XR Take 2 tablets twice daily after meals. Prescriptions Sent to Pharmacy Refills  
 indapamide (LOZOL) 2.5 mg tablet 10 Sig: Take 1 Tab by mouth daily. For blood pressure Class: Normal  
 Pharmacy: [facility] [address] Ph #: [phone] Route: Oral  
 atenolol (TENORMIN) 50 mg tablet 10 Sig: Take 1 Tab by mouth daily. Class: Normal  
 Pharmacy: [facility] [address] Ph #: [phone] Route: Oral  
  
We Performed the Following HEMOGLOBIN A1C WITH EAG [36390 CPT(R)] LIPID PANEL [76873 CPT(R)] METABOLIC PANEL, COMPREHENSIVE [39095 CPT(R)] MICROALBUMIN, UR, RAND W/ MICROALBUMIN/CREA RATIO [id number] CPT(R)] Follow-up Instructions Return in about 3 months (around 4/18/2018). Patient Instructions Diabetes and weight. Work on efforts to decrease processed food intake and eat more vegetables, beans/lentils, nuts, fruit, fish, chicken, etc 
** No more sugared beverages or juices. Water, unsweet tea. ** recommend not eating carbohydrates after 5 pm 
 
Medications: 
Continue metformin  -1000 mg twice daily Work on efforts to lose weight and increase exercise. Blood pressure: 
Continue atenolol -> decrease to 50 mg  
Continue valsartan/amlodipine 320/5 mg daily Add indapamide 2.5 mg daily Work on weight loss Introducing [facility] SERVICES! Dear Tom Forrest: Thank you for requesting a [employer] account. Our records indicate that you already have an active [employer] account. You can access your account anytime at [email or web address] Did you know that you can access your hospital and ER discharge instructions at any time in [employer]? You can also review all of your test results from your hospital stay or ER visit. Additional Information If you have questions, please visit the Frequently Asked Questions section of the [employer] website at [email or web address] Remember, [employer] is NOT to be used for urgent needs. For medical emergencies, dial 911. Now available from your iPhone and Android! Please provide this summary of care documentation to your next provider. Your primary care clinician is listed as Syed Hernandez. If you have any questions after today's visit, please call [phone].

## 2018-01-18 NOTE — PATIENT INSTRUCTIONS
Diabetes and weight. Work on efforts to decrease processed food intake and eat more vegetables, beans/lentils, nuts, fruit, fish, chicken, etc  ** No more sugared beverages or juices. Water, unsweet tea. ** recommend not eating carbohydrates after 5 pm    Medications:  Continue metformin  -1000 mg twice daily    Work on efforts to lose weight and increase exercise.       Blood pressure:  Continue atenolol -> decrease to 50 mg   Continue valsartan/amlodipine 320/5 mg daily  Add indapamide 2.5 mg daily    Work on weight loss

## 2018-01-19 LAB
ALBUMIN SERPL-MCNC: 4.3 G/DL (ref 3.5–5.5)
ALBUMIN/CREAT UR: 16 MG/G CREAT (ref 0–30)
ALBUMIN/GLOB SERPL: 1.5 {RATIO} (ref 1.2–2.2)
ALP SERPL-CCNC: 57 IU/L (ref 39–117)
ALT SERPL-CCNC: 48 IU/L (ref 0–32)
AST SERPL-CCNC: 28 IU/L (ref 0–40)
BILIRUB SERPL-MCNC: 0.3 MG/DL (ref 0–1.2)
BUN SERPL-MCNC: 9 MG/DL (ref 6–20)
BUN/CREAT SERPL: 18 (ref 9–23)
CALCIUM SERPL-MCNC: 9.6 MG/DL (ref 8.7–10.2)
CHLORIDE SERPL-SCNC: 103 MMOL/L (ref 96–106)
CHOLEST SERPL-MCNC: 165 MG/DL (ref 100–199)
CO2 SERPL-SCNC: 20 MMOL/L (ref 18–29)
CREAT SERPL-MCNC: 0.49 MG/DL (ref 0.57–1)
CREAT UR-MCNC: 77.7 MG/DL
EST. AVERAGE GLUCOSE BLD GHB EST-MCNC: 134 MG/DL
GLOBULIN SER CALC-MCNC: 2.8 G/DL (ref 1.5–4.5)
GLUCOSE SERPL-MCNC: 89 MG/DL (ref 65–99)
HBA1C MFR BLD: 6.3 % (ref 4.8–5.6)
HDLC SERPL-MCNC: 37 MG/DL
LDLC SERPL CALC-MCNC: 87 MG/DL (ref 0–99)
MICROALBUMIN UR-MCNC: 12.4 UG/ML
POTASSIUM SERPL-SCNC: 4.4 MMOL/L (ref 3.5–5.2)
PROT SERPL-MCNC: 7.1 G/DL (ref 6–8.5)
SODIUM SERPL-SCNC: 140 MMOL/L (ref 134–144)
TRIGL SERPL-MCNC: 205 MG/DL (ref 0–149)
VLDLC SERPL CALC-MCNC: 41 MG/DL (ref 5–40)

## 2018-06-21 ENCOUNTER — OFFICE VISIT (OUTPATIENT)
Dept: ENDOCRINOLOGY | Age: 38
End: 2018-06-21

## 2018-06-21 VITALS
HEART RATE: 88 BPM | SYSTOLIC BLOOD PRESSURE: 133 MMHG | DIASTOLIC BLOOD PRESSURE: 81 MMHG | WEIGHT: 260 LBS | HEIGHT: 65 IN | BODY MASS INDEX: 43.32 KG/M2

## 2018-06-21 DIAGNOSIS — R74.01 ELEVATED ALT MEASUREMENT: ICD-10-CM

## 2018-06-21 DIAGNOSIS — E11.9 TYPE 2 DIABETES MELLITUS WITHOUT COMPLICATION, WITHOUT LONG-TERM CURRENT USE OF INSULIN (HCC): Primary | ICD-10-CM

## 2018-06-21 DIAGNOSIS — E78.5 DYSLIPIDEMIA: ICD-10-CM

## 2018-06-21 DIAGNOSIS — I10 ESSENTIAL HYPERTENSION: ICD-10-CM

## 2018-06-21 NOTE — PROGRESS NOTES
History of Present Illness: Lexus Hudson is a 40 y.o. female presents for follow-up of diabetes. female presents for follow-up of diabetes. She has had diabetes since June 2015 - had gestational DM in 2012. .  A1c was 13.7 in summer 2015, then 6.1 in late 2015 and 5.7 in 2/2016.  6.3 and 1/0826     Complications: none     Current diabetes regimen:  Metformin ER 1000 mg twice daily - tolerating well     Glucoses:  Not monitoring.      Diet  Bringing lunch to work now, which is an improvement. She will generally have salad or sandwich for lunch. Still drinking Sprite. Having less juice.       Exercise: No dedicated exercise.     HTN:    Amlodipine/valsartan  Atenolol   Indapamide 2.5 mg.  Blood pressure is improved.     BMI 43 -weight is overall stable since last visit.     Social:  Has a 3 yo son. Going back to school to get masters in accounting. 2 months ago changed jobs. Now commute is 10 minutes (compared to 1-2 hours) and stress is better. Pay is better. Past Medical History:   Diagnosis Date    Advanced care planning/counseling discussion 6/1/16    Diabetes ([facility] [address] 6/2015    A1C 13    Gestational diabetes     HTN (hypertension)     Hyperlipidemia 1/2015    Migraines      Current Outpatient Prescriptions   Medication Sig    amLODIPine-valsartan (EXFORGE) 5-320 mg per tablet TAKE 1 TABLET BY MOUTH EVERY EVENING    indapamide (LOZOL) 2.5 mg tablet Take 1 Tab by mouth daily. For blood pressure    atenolol (TENORMIN) 50 mg tablet Take 1 Tab by mouth daily.  metFORMIN ER (GLUCOPHAGE XR) 500 mg tablet Take 2 tablets twice daily after meals.  Lancets misc Please dispensed what is approved by her insurance company    Blood-Glucose Meter monitoring kit For up to 3 times a day glucose checks. One touch ultra II    glucose blood VI test strips (ASCENSIA AUTODISC VI, ONE TOUCH ULTRA TEST VI) strip For three times a day glucose checks.  One Touch Ultra II    Insulin Needles, Disposable, 31 gauge x 5/16\" ndle For use with insulin pen     No current facility-administered medications for this visit. Allergies   Allergen Reactions    Ace Inhibitors Hives       Review of Systems:  - Eyes: no blurry vision or double vision  - Cardiovascular: no chest pain  - Respiratory: no shortness of breath  - Musculoskeletal: no myalgias. Describes some tenderness and pain in her sternal area. Describes tenderness with deep palpation. No exertional chest pain. - Neurological: no numbness/tingling in extremities    Physical Examination:  Visit Vitals    /81    Pulse 88    Ht 5' 5\" (1.651 m)    Wt 260 lb (117.9 kg)    BMI 43.27 kg/m2   -   - General: pleasant, no distress, normal gait   HEENT: hearing intact, EOMI, clear sclera without icterus  - Cardiovascular: regular, normal rate   - Respiratory: normal effort  - Integumentary: no edema  - Psychiatric: normal mood and affect    Data Reviewed:   Component      Latest Ref Rng & Units 1/18/2018 1/18/2018 1/18/2018 1/18/2018           1:27 PM  1:27 PM  1:27 PM  1:27 PM   Glucose      65 - 99 mg/dL    89   BUN      6 - 20 mg/dL    9   Creatinine      0.57 - 1.00 mg/dL    0.49 (L)   GFR est non-AA      >59 mL/min/1.73    125   GFR est AA      >59 mL/min/1.73    144   BUN/Creatinine ratio      9 - 23    18   Sodium      134 - 144 mmol/L    140   Potassium      3.5 - 5.2 mmol/L    4.4   Chloride      96 - 106 mmol/L    103   CO2      18 - 29 mmol/L    20   Calcium      8.7 - 10.2 mg/dL    9.6   Protein, total      6.0 - 8.5 g/dL    7.1   Albumin      3.5 - 5.5 g/dL    4.3   GLOBULIN, TOTAL      1.5 - 4.5 g/dL    2.8   A-G Ratio      1.2 - 2.2    1.5   Bilirubin, total      0.0 - 1.2 mg/dL    0.3   Alk.  phosphatase      39 - 117 IU/L    57   AST      0 - 40 IU/L    28   ALT (SGPT)      0 - 32 IU/L    48 (H)   Cholesterol, total      100 - 199 mg/dL  165     Triglyceride      0 - 149 mg/dL  205 (H)     HDL Cholesterol      >39 mg/dL  37 (L)     VLDL, calculated      5 - 40 mg/dL  41 (H)     LDL, calculated      0 - 99 mg/dL  87     Creatinine, urine      Not Estab. mg/dL 77.7      Microalbumin, urine      Not Estab. ug/mL 12.4      Microalbumin/Creat. Ratio      0.0 - 30.0 mg/g creat 16.0      Hemoglobin A1c, (calculated)      4.8 - 5.6 %   6.3 (H)    Estimated average glucose      mg/dL   134      Assessment/Plan:   1. Type 2 diabetes mellitus without complication, without long-term current use of insulin ([facility])   Check hemoglobin A1c  Continue metformin  Add Ozempic for glucose control and weight loss effects. Reviewed side effects. Also reviewed titration. 2. Essential hypertension   Continue current medications of amlodipine/valsartan, atenolol and indapamide. Blood pressure needs should decrease with weight loss   3. Dyslipidemia   Cholesterol and LDL are controlled, triglycerides and HDL are not optimal.  We will work on weight loss efforts and exercise and reassess. 4. Elevated ALT measurement   Reviewed that this is likely representative of nonalcoholic fatty liver disease. Encouraged avoidance of added sugars, increasing exercise and weight loss. 5. BMI 40.0-44.9, adult ([facility] [address]   She has several weight related comorbidities; diabetes, dyslipidemia, hypertension, fatty liver disease. Feel the weight loss effects of Ozempic may be substantial.      Patient Instructions   Diabetes and weight. Increase exercise  Avoid liquid calories. Medications:  Continue metformin  -1000 mg twice daily    Add Ozempic - 0.25 mg weekly for 4 weeks, then increase to 0.5 mg weekly for 4-6 weeks. After taking 0.5 mg for 4 weeks, can increase to 1.0 mg for greater effect on glucoses and weight loss. Work on efforts to lose weight and increase exercise.       Blood pressure:  Continue atenolol   Continue valsartan/amlodipine 320/5 mg daily  Continue ndapamide 2.5 mg daily    Liver enzyme elevation and high triglycerides and HDL  - less sugar intake, weight loss, exercise will help. Follow-up Disposition:  Return in about 3 months (around 9/21/2018).     Copy sent to:

## 2018-06-21 NOTE — MR AVS SNAPSHOT
[address] [place] [address] 57 
[phone] Patient: Awais Connolly MRN:  [id number] Visit Information Date & Time Provider Department Dept. Phone Encounter #  
 6/21/2018  3:50 PM Pradip Starkey, [address] [place] [address] Diabetes and Endocrinology [phone] [id number] Follow-up Instructions Return in about 3 months (around 9/21/2018). Upcoming Health Maintenance Date Due  
 EYE EXAM RETINAL OR DILATED Q1 8/12/1990 Pneumococcal 19-64 Medium Risk (1 of 1 - PPSV23) 8/12/1999 FOOT EXAM Q1 6/22/2017 PAP AKA CERVICAL CYTOLOGY 2/16/2018 HEMOGLOBIN A1C Q6M 7/18/2018 Influenza Age 5 to Adult 8/1/2018 MICROALBUMIN Q1 1/18/2019 LIPID PANEL Q1 1/18/2019 DTaP/Tdap/Td series (2 - Td) 12/10/2023 Allergies as of 6/21/2018  Review Complete On: 6/21/2018 By: Pradip Starkey MD  
  
 Severity Noted Reaction Type Reactions Ace Inhibitors  04/07/2010    Hives Current Immunizations  Reviewed on 6/1/2016 Name Date Tdap 12/10/2013 Not reviewed this visit You Were Diagnosed With   
  
 Codes Comments Type 2 diabetes mellitus without complication, without long-term current use of insulin (HCC)    -  Primary ICD-10-CM: E11.9 ICD-9-CM: 250.00 Essential hypertension     ICD-10-CM: I10 
ICD-9-CM: 401.9 Dyslipidemia     ICD-10-CM: E78.5 ICD-9-CM: 272.4 Elevated ALT measurement     ICD-10-CM: R74.0 ICD-9-CM: 790.4 BMI 40.0-44.9, adult [facility])     ICD-10-CM: Z68.41 
ICD-9-CM: V85.41 Vitals BP Pulse Height(growth percentile) Weight(growth percentile) BMI OB Status 133/81 88 5' 5\" (1.651 m) 260 lb (117.9 kg) 43.27 kg/m2 Having regular periods Smoking Status Current Every Day Smoker Vitals History BMI and BSA Data Body Mass Index Body Surface Area  
 43.27 kg/m 2 2.33 m 2 Preferred Pharmacy Pharmacy Name Phone [facility] [address]  [place] [phone] Your Updated Medication List  
  
   
This list is accurate as of 18  4:50 PM.  Always use your most recent med list. amLODIPine-valsartan 5-320 mg per tablet Commonly known as:  EXFORGE  
TAKE 1 TABLET BY MOUTH EVERY EVENING  
  
 atenolol 50 mg tablet Commonly known as:  TENORMIN Take 1 Tab by mouth daily. Blood-Glucose Meter monitoring kit For up to 3 times a day glucose checks. One touch ultra II  
  
 glucose blood VI test strips strip Commonly known as:  ASCENSIA AUTODISC VI, ONE TOUCH ULTRA TEST VI For three times a day glucose checks. One Touch Ultra II  
  
 indapamide 2.5 mg tablet Commonly known as:  [place]andra [place] Take 1 Tab by mouth daily. For blood pressure Insulin Needles (Disposable) 31 gauge x 5/16\" Ndle For use with insulin pen Lancets Misc Please dispensed what is approved by her insurance company  
  
 metFORMIN  mg tablet Commonly known as:  GLUCOPHAGE XR Take 2 tablets twice daily after meals. semaglutide 0.25 mg/0.2 mL (2 mg/1.5 mL) sub-q pen Commonly known as:  OZEMPIC  
0.5 mg by SubCUTAneous route every seven (7) days. Prescriptions Sent to Pharmacy Refills  
 semaglutide (OZEMPIC) 0.25 mg/0.2 mL (2 mg/1.5 mL) sub-q pen 11 Si.5 mg by SubCUTAneous route every seven (7) days. Class: Normal  
 Pharmacy: [facility] [address] #: [phone] Route: SubCUTAneous Follow-up Instructions Return in about 3 months (around 2018). Patient Instructions Diabetes and weight. Increase exercise Avoid liquid calories. Medications: 
Continue metformin  -1000 mg twice daily Add Ozempic - 0.25 mg weekly for 4 weeks, then increase to 0.5 mg weekly for 4-6 weeks. After taking 0.5 mg for 4 weeks, can increase to 1.0 mg for greater effect on glucoses and weight loss. Work on efforts to lose weight and increase exercise. Blood pressure: 
Continue atenolol Continue valsartan/amlodipine 320/5 mg daily Continue ndapamide 2.5 mg daily Liver enzyme elevation and high triglycerides and HDL 
- less sugar intake, weight loss, exercise will help. Introducing [facility]! Dear Teressa Pendleton: Thank you for requesting a [employer] account. Our records indicate that you already have an active [employer] account. You can access your account anytime at [email or web address] Did you know that you can access your hospital and ER discharge instructions at any time in [employer]? You can also review all of your test results from your hospital stay or ER visit. Additional Information If you have questions, please visit the Frequently Asked Questions section of the [employer] website at [email or web address] Remember, [employer] is NOT to be used for urgent needs. For medical emergencies, dial 911. Now available from your iPhone and Android! Please provide this summary of care documentation to your next provider. If you have any questions after today's visit, please call [phone].

## 2018-07-30 RX ORDER — METFORMIN HYDROCHLORIDE 500 MG/1
TABLET, EXTENDED RELEASE ORAL
Qty: 120 TAB | Refills: 12 | Status: SHIPPED | OUTPATIENT
Start: 2018-07-30 | End: 2019-09-16 | Stop reason: SDUPTHER

## 2018-07-30 NOTE — TELEPHONE ENCOUNTER
PCP: No primary care provider on file. Last appt: 6/21/2018 Future Appointments Date Time Provider Sheron Maryam 9/17/2018 1:50 PM Stefani Cordoba MD [address] Requested Prescriptions Pending Prescriptions Disp Refills  metFORMIN ER (GLUCOPHAGE XR) 500 mg tablet 120 Tab 12 Sig: Take 2 tablets twice daily after meals. Last refill: 7/11/2017

## 2018-07-31 ENCOUNTER — TELEPHONE (OUTPATIENT)
Dept: ENDOCRINOLOGY | Age: 38
End: 2018-07-31

## 2018-07-31 NOTE — TELEPHONE ENCOUNTER
7/31/2018 11:09 AM 
 
 
Refill for 
 
-metFORMIN ER (GLUCOPHAGE XR) 500 mg tablet Please send to 
[facility] [address] [place] [address], [facility] Meredith Pathak  [place] Thanks

## 2018-11-05 ENCOUNTER — OFFICE VISIT (OUTPATIENT)
Dept: ENDOCRINOLOGY | Age: 38
End: 2018-11-05

## 2018-11-05 VITALS
RESPIRATION RATE: 12 BRPM | WEIGHT: 253 LBS | DIASTOLIC BLOOD PRESSURE: 83 MMHG | OXYGEN SATURATION: 93 % | BODY MASS INDEX: 42.15 KG/M2 | SYSTOLIC BLOOD PRESSURE: 145 MMHG | HEART RATE: 78 BPM | HEIGHT: 65 IN

## 2018-11-05 DIAGNOSIS — E11.9 TYPE 2 DIABETES MELLITUS WITHOUT COMPLICATION, WITHOUT LONG-TERM CURRENT USE OF INSULIN (HCC): Primary | ICD-10-CM

## 2018-11-05 DIAGNOSIS — R74.8 LIVER ENZYME ELEVATION: ICD-10-CM

## 2018-11-05 DIAGNOSIS — E87.6 HYPOKALEMIA: ICD-10-CM

## 2018-11-05 DIAGNOSIS — I10 ESSENTIAL HYPERTENSION: ICD-10-CM

## 2018-11-05 RX ORDER — AMILORIDE HYDROCHLORIDE 5 MG/1
10 TABLET ORAL DAILY
Qty: 60 TAB | Refills: 10 | Status: SHIPPED | OUTPATIENT
Start: 2018-11-05 | End: 2018-11-05 | Stop reason: SDUPTHER

## 2018-11-05 RX ORDER — ATENOLOL 25 MG/1
25 TABLET ORAL DAILY
Qty: 30 TAB | Refills: 1 | Status: SHIPPED | OUTPATIENT
Start: 2018-11-05 | End: 2018-11-05 | Stop reason: SDUPTHER

## 2018-11-05 NOTE — PROGRESS NOTES
Marco Page is a 45 y.o. female Chief Complaint Patient presents with  Follow-up  Diabetes  Other Eye exam due 1. Have you been to the ER, urgent care clinic since your last visit? Hospitalized since your last visit? No 
 
2. Have you seen or consulted any other health care providers outside of the [address] [place] [address] since your last visit? Include any pap smears or colon screening.  No

## 2018-11-05 NOTE — PROGRESS NOTES
History of Present Illness: Bran Sun is a 45 y.o. female presents for follow-up of diabetes. She has had diabetes since June 2015 - had gestational DM in 2012. .  A1c was 13.7 in summer 2015, then 6.1 in late 2015 and 5.7 in 2/2016.  6.3 and 7/1854 
  
Complications: none 
  
Current diabetes regimen: 
Metformin ER 1000 mg twice daily - tolerating well Took Ozempic. Felt this helped, but then was not covered - appeared prior auth may never have been done though. Glucoses: Not monitoring.  
  
Diet Water and tea (some sweet) are main beverages. .   
Less Sprite Feels her appetite and portions are less. 
  
Exercise: No dedicated exercise. Needs to improve upon this.  
  
HTN:   
Amlodipine/valsartan Atenolol Indapamide 2.5 mg. 
Blood pressure is improved from 1/2018, but remains above goal.  
  
BMI 42 down subtly Social: 
Has a 3 yo son who accompanies her today. Going back to school to get masters in accounting. Past Medical History:  
Diagnosis Date  Advanced care planning/counseling discussion 6/1/16  Diabetes ([address] 6/2015 A1C 13  
 Gestational diabetes  HTN (hypertension)  Hyperlipidemia 1/2015  Migraines Current Outpatient Medications Medication Sig  
 metFORMIN ER (GLUCOPHAGE XR) 500 mg tablet Take 2 tablets twice daily after meals.  amLODIPine-valsartan (EXFORGE) 5-320 mg per tablet TAKE 1 TABLET BY MOUTH EVERY EVENING  indapamide (LOZOL) 2.5 mg tablet Take 1 Tab by mouth daily. For blood pressure  atenolol (TENORMIN) 50 mg tablet Take 1 Tab by mouth daily.  Lancets misc Please dispensed what is approved by her insurance company  Blood-Glucose Meter monitoring kit For up to 3 times a day glucose checks. One touch ultra II  
 glucose blood VI test strips (ASCENSIA AUTODISC VI, ONE TOUCH ULTRA TEST VI) strip For three times a day glucose checks.  One Touch Ultra II  
  Insulin Needles, Disposable, 31 gauge x 5/16\" ndle For use with insulin pen  semaglutide (OZEMPIC) 0.25 mg/0.2 mL (2 mg/1.5 mL) sub-q pen 0.5 mg by SubCUTAneous route every seven (7) days. (Patient not taking: Reported on 11/5/2018)  semaglutide (OZEMPIC) 0.25 mg/0.2 mL (2 mg/1.5 mL) sub-q pen 0.25 mg by SubCUTAneous route every seven (7) days. (Patient not taking: Reported on 11/5/2018) No current facility-administered medications for this visit. Allergies Allergen Reactions  Ace Inhibitors Hives Review of Systems: - Eyes: no blurry vision or double vision - Cardiovascular: no chest pain - Respiratory: no shortness of breath - Musculoskeletal: no myalgias - Neurological: no numbness/tingling in extremities Physical Examination: 
Visit Vitals /83 (BP 1 Location: Right arm, BP Patient Position: Sitting) Pulse 78 Resp 12 Ht 5' 5\" (1.651 m) Wt 253 lb (114.8 kg) SpO2 93% BMI 42.10 kg/m²  
-  
- General: pleasant, no distress, normal gait HEENT: hearing intact, EOMI, clear sclera without icterus - Cardiovascular: regular, normal rate - Respiratory: normal effort - Integumentary: no edema Diabetic foot exam:  
 
Right Foot: 
 Visual Exam: normal  
 Pulse DP: 2+ (normal) Filament test: normal sensation - Psychiatric: normal mood and affect Data Reviewed:  
Component Latest Ref Rng & Units 1/18/2018 1/18/2018 1/18/2018 1/18/2018  
 
      1:27 PM  1:27 PM  1:27 PM  1:27 PM  
Glucose 65 - 99 mg/dL    89 BUN 
    6 - 20 mg/dL    9 Creatinine 
    0.57 - 1.00 mg/dL    0.49 (L) GFR est non-AA 
    >59 mL/min/1.73    125 GFR est AA 
    >59 mL/min/1.73    144 BUN/Creatinine ratio 9 - 23    18 Sodium 134 - 144 mmol/L    140 Potassium 3.5 - 5.2 mmol/L    4.4 Chloride 96 - 106 mmol/L    103 CO2 
    18 - 29 mmol/L    20 Calcium 8.7 - 10.2 mg/dL    9.6 Protein, total 
    6.0 - 8.5 g/dL    7.1 Albumin 3.5 - 5.5 g/dL    4.3 GLOBULIN, TOTAL 
    1.5 - 4.5 g/dL    2.8 A-G Ratio 1.2 - 2.2    1.5 Bilirubin, total 
    0.0 - 1.2 mg/dL    0.3 Alk. phosphatase 39 - 117 IU/L    57 AST 
    0 - 40 IU/L    28 ALT (SGPT) 0 - 32 IU/L    48 (H) Cholesterol, total 
    100 - 199 mg/dL  165 Triglyceride 0 - 149 mg/dL  205 (H) HDL Cholesterol >39 mg/dL  37 (L) VLDL, calculated 5 - 40 mg/dL  41 (H) LDL, calculated 0 - 99 mg/dL  87 Creatinine, urine Not Estab. mg/dL 77.7 Microalbumin, urine Not Estab. ug/mL 12.4 Microalbumin/Creat. Ratio 
    0.0 - 30.0 mg/g creat 16.0 Hemoglobin A1c, (calculated) 4.8 - 5.6 %   6.3 (H) Estimated average glucose 
    mg/dL   134 Assessment/Plan: 1. Type 2 diabetes mellitus without complication, without long-term current use of insulin ([facility] - await A1c. 
- weight improvement is promising 
- continue metformin 
- re-try Ozempic 
- encouraged dietary efforts and weight loss. Needs to add exercise 2. Essential hypertension  
- continue indapamide and amlodipine/valsartan 
- add amiloride 10 mg 
- taper off atenolol - 25 mg for 1 week then 12.5 mg for 1 week then stop 3. BMI 40.0-44.9, adult ([facility]  
- continue efforts to decrease liquid calorie intake 
- needs to start exercising 4. Liver enzyme elevation  
- encouraged wt loss, lower sugar intake, exercise Patient Instructions Diabetes and weight. Increase exercise Avoid liquid calories. Medications: 
Continue metformin  -1000 mg twice daily Retry Ozempic - 0.25 mg weekly for 4 weeks, then increase to 0.5 mg weekly for 4-6 weeks. After taking 0.5 mg for 4 weeks, can increase to 1.0 mg for greater effect on glucoses and weight loss. Work on efforts to lose weight and increase exercise. Blood pressure: 
Continue valsartan/amlodipine 320/5 mg daily Continue indapamide 2.5 mg daily - Decrease atenolol to 25 mg daily for 1 week, then 1/2 tablet for 1 week, then stop 
- start amiloride 5 mg - two tablets (10 mg) daily. Liver enzyme elevation and high triglycerides and HDL 
- reassess.  
- less sugar intake, weight loss, exercise will help. Follow-up Disposition: 
Return in about 6 months (around 5/5/2019). Copy sent to:

## 2018-11-05 NOTE — PATIENT INSTRUCTIONS
Diabetes and weight. Increase exercise Avoid liquid calories. Medications: 
Continue metformin  -1000 mg twice daily Work on efforts to lose weight and increase exercise. Blood pressure: 
Continue valsartan/amlodipine 320/5 mg daily Continue indapamide 2.5 mg daily Continue amiloride 10 mg/day Liver enzyme elevation and high triglycerides and HDL 
- reassess.  
- less sugar intake, weight loss, exercise will help.

## 2018-11-06 LAB
ALBUMIN SERPL-MCNC: 4.1 G/DL (ref 3.5–5.5)
ALBUMIN/GLOB SERPL: 1.4 {RATIO} (ref 1.2–2.2)
ALP SERPL-CCNC: 59 IU/L (ref 39–117)
ALT SERPL-CCNC: 17 IU/L (ref 0–32)
AST SERPL-CCNC: 12 IU/L (ref 0–40)
BILIRUB SERPL-MCNC: <0.2 MG/DL (ref 0–1.2)
BUN SERPL-MCNC: 10 MG/DL (ref 6–20)
BUN/CREAT SERPL: 18 (ref 9–23)
CALCIUM SERPL-MCNC: 9.6 MG/DL (ref 8.7–10.2)
CHLORIDE SERPL-SCNC: 99 MMOL/L (ref 96–106)
CO2 SERPL-SCNC: 25 MMOL/L (ref 20–29)
CREAT SERPL-MCNC: 0.57 MG/DL (ref 0.57–1)
EST. AVERAGE GLUCOSE BLD GHB EST-MCNC: 140 MG/DL
GLOBULIN SER CALC-MCNC: 2.9 G/DL (ref 1.5–4.5)
GLUCOSE SERPL-MCNC: 154 MG/DL (ref 65–99)
HBA1C MFR BLD: 6.5 % (ref 4.8–5.6)
POTASSIUM SERPL-SCNC: 3.3 MMOL/L (ref 3.5–5.2)
PROT SERPL-MCNC: 7 G/DL (ref 6–8.5)
SODIUM SERPL-SCNC: 137 MMOL/L (ref 134–144)

## 2019-04-01 ENCOUNTER — HOSPITAL ENCOUNTER (EMERGENCY)
Age: 39
Discharge: HOME | End: 2019-04-01
Attending: EMERGENCY MEDICINE | Admitting: EMERGENCY MEDICINE
Payer: COMMERCIAL

## 2019-04-01 VITALS
RESPIRATION RATE: 18 BRPM | SYSTOLIC BLOOD PRESSURE: 142 MMHG | WEIGHT: 239 LBS | HEIGHT: 64 IN | DIASTOLIC BLOOD PRESSURE: 73 MMHG | TEMPERATURE: 98.9 F | BODY MASS INDEX: 40.8 KG/M2 | OXYGEN SATURATION: 99 % | HEART RATE: 78 BPM

## 2019-04-01 DIAGNOSIS — E87.6 HYPOKALEMIA: ICD-10-CM

## 2019-04-01 DIAGNOSIS — K52.9 GASTROENTERITIS: Primary | ICD-10-CM

## 2019-04-01 LAB
ALBUMIN SERPL-MCNC: 3.7 G/DL (ref 3.5–5)
ALBUMIN/GLOB SERPL: 0.8 {RATIO} (ref 1.1–2.2)
ALP SERPL-CCNC: 66 U/L (ref 45–117)
ALT SERPL-CCNC: 26 U/L (ref 12–78)
ANION GAP BLD CALC-SCNC: 16 MMOL/L (ref 10–20)
ANION GAP SERPL CALC-SCNC: 9 MMOL/L (ref 5–15)
AST SERPL-CCNC: 16 U/L (ref 15–37)
BASOPHILS # BLD: 0 K/UL (ref 0–0.1)
BASOPHILS NFR BLD: 0 % (ref 0–1)
BILIRUB SERPL-MCNC: 0.4 MG/DL (ref 0.2–1)
BUN BLD-MCNC: 12 MG/DL (ref 9–20)
BUN SERPL-MCNC: 12 MG/DL (ref 6–20)
BUN/CREAT SERPL: 17 (ref 12–20)
CA-I BLD-MCNC: 1.05 MMOL/L (ref 1.12–1.32)
CALCIUM SERPL-MCNC: 9 MG/DL (ref 8.5–10.1)
CHLORIDE BLD-SCNC: 101 MMOL/L (ref 98–107)
CHLORIDE SERPL-SCNC: 99 MMOL/L (ref 97–108)
CO2 BLD-SCNC: 26 MMOL/L (ref 21–32)
CO2 SERPL-SCNC: 29 MMOL/L (ref 21–32)
CREAT BLD-MCNC: 0.6 MG/DL (ref 0.6–1.3)
CREAT SERPL-MCNC: 0.69 MG/DL (ref 0.55–1.02)
DIFFERENTIAL METHOD BLD: ABNORMAL
EOSINOPHIL # BLD: 0 K/UL (ref 0–0.4)
EOSINOPHIL NFR BLD: 0 % (ref 0–7)
ERYTHROCYTE [DISTWIDTH] IN BLOOD BY AUTOMATED COUNT: 11.9 % (ref 11.5–14.5)
GLOBULIN SER CALC-MCNC: 4.5 G/DL (ref 2–4)
GLUCOSE BLD-MCNC: 193 MG/DL (ref 65–100)
GLUCOSE SERPL-MCNC: 157 MG/DL (ref 65–100)
HCG SERPL QL: NEGATIVE
HCT VFR BLD AUTO: 42.9 % (ref 35–47)
HCT VFR BLD CALC: 41 % (ref 35–47)
HGB BLD-MCNC: 14.7 G/DL (ref 11.5–16)
IMM GRANULOCYTES # BLD AUTO: 0 K/UL (ref 0–0.04)
IMM GRANULOCYTES NFR BLD AUTO: 0 % (ref 0–0.5)
LIPASE SERPL-CCNC: 96 U/L (ref 73–393)
LYMPHOCYTES # BLD: 1.2 K/UL (ref 0.8–3.5)
LYMPHOCYTES NFR BLD: 18 % (ref 12–49)
MAGNESIUM SERPL-MCNC: 1.7 MG/DL (ref 1.6–2.4)
MCH RBC QN AUTO: 30.2 PG (ref 26–34)
MCHC RBC AUTO-ENTMCNC: 34.3 G/DL (ref 30–36.5)
MCV RBC AUTO: 88.3 FL (ref 80–99)
MONOCYTES # BLD: 0.4 K/UL (ref 0–1)
MONOCYTES NFR BLD: 6 % (ref 5–13)
NEUTS SEG # BLD: 5 K/UL (ref 1.8–8)
NEUTS SEG NFR BLD: 76 % (ref 32–75)
NRBC # BLD: 0 K/UL (ref 0–0.01)
NRBC BLD-RTO: 0 PER 100 WBC
PLATELET # BLD AUTO: 496 K/UL (ref 150–400)
PMV BLD AUTO: 8.7 FL (ref 8.9–12.9)
POTASSIUM BLD-SCNC: 3.5 MMOL/L (ref 3.5–5.1)
POTASSIUM SERPL-SCNC: 2.7 MMOL/L (ref 3.5–5.1)
PROT SERPL-MCNC: 8.2 G/DL (ref 6.4–8.2)
RBC # BLD AUTO: 4.86 M/UL (ref 3.8–5.2)
SERVICE CMNT-IMP: ABNORMAL
SODIUM BLD-SCNC: 139 MMOL/L (ref 136–145)
SODIUM SERPL-SCNC: 137 MMOL/L (ref 136–145)
WBC # BLD AUTO: 6.6 K/UL (ref 3.6–11)

## 2019-04-01 PROCEDURE — 96375 TX/PRO/DX INJ NEW DRUG ADDON: CPT

## 2019-04-01 PROCEDURE — 36415 COLL VENOUS BLD VENIPUNCTURE: CPT

## 2019-04-01 PROCEDURE — 74011250636 HC RX REV CODE- 250/636: Performed by: PHYSICIAN ASSISTANT

## 2019-04-01 PROCEDURE — 96361 HYDRATE IV INFUSION ADD-ON: CPT

## 2019-04-01 PROCEDURE — 99284 EMERGENCY DEPT VISIT MOD MDM: CPT

## 2019-04-01 PROCEDURE — 96365 THER/PROPH/DIAG IV INF INIT: CPT

## 2019-04-01 PROCEDURE — 96366 THER/PROPH/DIAG IV INF ADDON: CPT

## 2019-04-01 PROCEDURE — 80053 COMPREHEN METABOLIC PANEL: CPT

## 2019-04-01 PROCEDURE — 85025 COMPLETE CBC W/AUTO DIFF WBC: CPT

## 2019-04-01 PROCEDURE — 80047 BASIC METABLC PNL IONIZED CA: CPT

## 2019-04-01 PROCEDURE — 74011250637 HC RX REV CODE- 250/637: Performed by: PHYSICIAN ASSISTANT

## 2019-04-01 PROCEDURE — 83735 ASSAY OF MAGNESIUM: CPT

## 2019-04-01 PROCEDURE — 83690 ASSAY OF LIPASE: CPT

## 2019-04-01 PROCEDURE — 84703 CHORIONIC GONADOTROPIN ASSAY: CPT

## 2019-04-01 RX ORDER — POTASSIUM CHLORIDE 7.45 MG/ML
10 INJECTION INTRAVENOUS ONCE
Status: COMPLETED | OUTPATIENT
Start: 2019-04-01 | End: 2019-04-01

## 2019-04-01 RX ORDER — POTASSIUM CHLORIDE 750 MG/1
10 TABLET, FILM COATED, EXTENDED RELEASE ORAL DAILY
Qty: 3 TAB | Refills: 0 | Status: SHIPPED | OUTPATIENT
Start: 2019-04-01 | End: 2019-04-04

## 2019-04-01 RX ORDER — ONDANSETRON 2 MG/ML
4 INJECTION INTRAMUSCULAR; INTRAVENOUS
Status: COMPLETED | OUTPATIENT
Start: 2019-04-01 | End: 2019-04-01

## 2019-04-01 RX ORDER — ONDANSETRON 4 MG/1
4 TABLET, ORALLY DISINTEGRATING ORAL
Qty: 10 TAB | Refills: 0 | Status: SHIPPED | OUTPATIENT
Start: 2019-04-01 | End: 2019-08-02

## 2019-04-01 RX ORDER — KETOROLAC TROMETHAMINE 30 MG/ML
30 INJECTION, SOLUTION INTRAMUSCULAR; INTRAVENOUS
Status: COMPLETED | OUTPATIENT
Start: 2019-04-01 | End: 2019-04-01

## 2019-04-01 RX ORDER — POTASSIUM CHLORIDE 7.45 MG/ML
10 INJECTION INTRAVENOUS
Status: DISCONTINUED | OUTPATIENT
Start: 2019-04-01 | End: 2019-04-02 | Stop reason: HOSPADM

## 2019-04-01 RX ORDER — POTASSIUM CHLORIDE 750 MG/1
40 TABLET, FILM COATED, EXTENDED RELEASE ORAL
Status: DISCONTINUED | OUTPATIENT
Start: 2019-04-01 | End: 2019-04-02 | Stop reason: HOSPADM

## 2019-04-01 RX ORDER — POTASSIUM CHLORIDE 7.45 MG/ML
10 INJECTION INTRAVENOUS
Status: DISCONTINUED | OUTPATIENT
Start: 2019-04-01 | End: 2019-04-01

## 2019-04-01 RX ADMIN — KETOROLAC TROMETHAMINE 30 MG: 30 INJECTION, SOLUTION INTRAMUSCULAR; INTRAVENOUS at 17:50

## 2019-04-01 RX ADMIN — POTASSIUM CHLORIDE 40 MEQ: 750 TABLET, EXTENDED RELEASE ORAL at 18:50

## 2019-04-01 RX ADMIN — SODIUM CHLORIDE 1000 ML: 900 INJECTION, SOLUTION INTRAVENOUS at 17:50

## 2019-04-01 RX ADMIN — POTASSIUM CHLORIDE 10 MEQ: 7.46 INJECTION, SOLUTION INTRAVENOUS at 18:50

## 2019-04-01 RX ADMIN — ONDANSETRON 4 MG: 2 SOLUTION INTRAMUSCULAR; INTRAVENOUS at 17:50

## 2019-04-01 RX ADMIN — POTASSIUM CHLORIDE 10 MEQ: 7.46 INJECTION, SOLUTION INTRAVENOUS at 20:02

## 2019-04-01 NOTE — LETTER
[facility] EMERGENCY DEPT 
[address] [place] [id number] [phone] Work/School Note Date: 4/1/2019 To Whom It May concern: 
 
Deni Jones was seen and treated today in the emergency room by the following provider(s): 
Attending Provider: Wendy Adair MD 
Physician Assistant: YVAN Chino. Deni Jones may return to work on 4/3/2019. Sincerely, YVAN Khan

## 2019-04-01 NOTE — ED NOTES
Bedside and Verbal shift change report given to Linda Reynolds RN (oncoming nurse) by Dino Mchugh RN (offgoing nurse). Report included the following information SBAR, Kardex and MAR.

## 2019-04-01 NOTE — ED PROVIDER NOTES
EMERGENCY DEPARTMENT HISTORY AND PHYSICAL EXAM 
 
 
Date: 2019 Patient Name: Bharat Payton History of Presenting Illness Chief Complaint Patient presents with  Diarrhea  Vomiting History Provided By: Patient HPI: Bharat Payton, 45 y.o. female with PMHx significant for hypertension, DM, migraines, hyperlipidemia, presents ambulatory to the ED with cc of multiple bouts NBNB emesis and NB diarrhea x 3 days with associated intermittent cramping epigastric pain. Has taken Tums, Zantac, and ibuprofen without relief. LMP: 3 weeks ago. Currently uses Implanon for birth control. Previous abdominal surgeries includes  section. Reports decreased appetite and has not been able to keep anything down. Denies aggravating or alleviating symptoms. Denies history of pancreatitis. There are no other complaints, changes, or physical findings at this time. PCP: None No current facility-administered medications on file prior to encounter. Current Outpatient Medications on File Prior to Encounter Medication Sig Dispense Refill  amLODIPine-valsartan (EXFORGE) 5-320 mg per tablet TAKE 1 TABLET BY MOUTH EVERY EVENING 90 Tab 3  
 indapamide (LOZOL) 2.5 mg tablet Take 1 Tab by mouth daily. For blood pressure 90 Tab 3  
 semaglutide (OZEMPIC) 0.25 mg/0.2 mL (2 mg/1.5 mL) sub-q pen 0.5 mg by SubCUTAneous route every seven (7) days. 1 Box 11  
 atenolol (TENORMIN) 25 mg tablet TAKE 1 TABLET BY MOUTH DAILY 90 Tab 0  
 aMILoride (MIDAMOR) 5 mg tablet TAKE 2 TABLETS BY MOUTH DAILY FOR BLOOD PRESSURE 180 Tab 3  
 metFORMIN ER (GLUCOPHAGE XR) 500 mg tablet Take 2 tablets twice daily after meals. 120 Tab 12  Lancets misc Please dispensed what is approved by her insurance company 100 Package 4  Blood-Glucose Meter monitoring kit For up to 3 times a day glucose checks.  One touch ultra II 1 Kit 0  
 glucose blood VI test strips (ASCENSIA AUTODISC VI, ONE TOUCH ULTRA TEST VI) strip For three times a day glucose checks. One Touch Ultra  Strip 4  
 Insulin Needles, Disposable, 31 gauge x \" ndle For use with insulin pen 100 Pen Needle 11 Past History Past Medical History: 
Past Medical History:  
Diagnosis Date  Advanced care planning/counseling discussion 16  Diabetes ([facility] [address] 2015 A1C 13  
 Gestational diabetes  HTN (hypertension)  Hyperlipidemia 2015  Migraines Past Surgical History: 
Past Surgical History:  
Procedure Laterality Date  HX  SECTION  2014  HX GYN   Implant - Nexplanon  HX WISDOM TEETH EXTRACTION Family History: 
Family History Problem Relation Age of Onset  Diabetes Mother 39  
     on insulin now  Hypertension Father  High Cholesterol Father  Diabetes Sister 32  
     type 2  
 Diabetes Maternal Grandmother  Other Maternal Grandfather MVA  Diabetes Paternal Grandmother  Cancer Paternal Grandfather   
     unknown Social History: 
Social History Tobacco Use  Smoking status: Current Every Day Smoker Packs/day: 0.25  Smokeless tobacco: Never Used  Tobacco comment: reports she is cutting back Substance Use Topics  Alcohol use: No  
  Alcohol/week: 0.0 oz  Drug use: No  
 
 
Allergies: Allergies Allergen Reactions  Ace Inhibitors Hives Review of Systems Review of Systems Constitutional: Negative for chills and fever. Respiratory: Negative for shortness of breath. Cardiovascular: Negative for chest pain. Gastrointestinal: Positive for abdominal pain, diarrhea, nausea and vomiting. Genitourinary: Negative for flank pain. Musculoskeletal: Negative for back pain and myalgias. Skin: Negative for color change, pallor, rash and wound. Neurological: Negative for dizziness, weakness and light-headedness. All other systems reviewed and are negative.  
 
 
Physical Exam  
Physical Exam  
 Constitutional: She is oriented to person, place, and time. She appears well-developed and well-nourished. No distress. HENT:  
Head: Normocephalic and atraumatic. Eyes: Conjunctivae are normal.  
Cardiovascular: Normal rate, regular rhythm and normal heart sounds. Pulmonary/Chest: Effort normal and breath sounds normal. No respiratory distress. Abdominal: Soft. Normal appearance and bowel sounds are normal. She exhibits no distension. There is tenderness in the epigastric area. Abdomen soft Musculoskeletal: Normal range of motion. Neurological: She is alert and oriented to person, place, and time. Skin: Skin is warm. No rash noted. Psychiatric: She has a normal mood and affect. Her behavior is normal.  
Nursing note and vitals reviewed. Diagnostic Study Results Labs - Recent Results (from the past 12 hour(s)) MAGNESIUM Collection Time: 04/01/19  5:42 PM  
Result Value Ref Range Magnesium 1.7 1.6 - 2.4 mg/dL CBC WITH AUTOMATED DIFF Collection Time: 04/01/19  5:45 PM  
Result Value Ref Range WBC 6.6 3.6 - 11.0 K/uL  
 RBC 4.86 3.80 - 5.20 M/uL  
 HGB 14.7 11.5 - 16.0 g/dL HCT 42.9 35.0 - 47.0 % MCV 88.3 80.0 - 99.0 FL  
 MCH 30.2 26.0 - 34.0 PG  
 MCHC 34.3 30.0 - 36.5 g/dL  
 RDW 11.9 11.5 - 14.5 % PLATELET 827 (H) 346 - 400 K/uL MPV 8.7 (L) 8.9 - 12.9 FL  
 NRBC 0.0 0  WBC ABSOLUTE NRBC 0.00 0.00 - 0.01 K/uL NEUTROPHILS 76 (H) 32 - 75 % LYMPHOCYTES 18 12 - 49 % MONOCYTES 6 5 - 13 % EOSINOPHILS 0 0 - 7 % BASOPHILS 0 0 - 1 % IMMATURE GRANULOCYTES 0 0.0 - 0.5 % ABS. NEUTROPHILS 5.0 1.8 - 8.0 K/UL  
 ABS. LYMPHOCYTES 1.2 0.8 - 3.5 K/UL  
 ABS. MONOCYTES 0.4 0.0 - 1.0 K/UL  
 ABS. EOSINOPHILS 0.0 0.0 - 0.4 K/UL  
 ABS. BASOPHILS 0.0 0.0 - 0.1 K/UL  
 ABS. IMM. GRANS. 0.0 0.00 - 0.04 K/UL  
 DF AUTOMATED METABOLIC PANEL, COMPREHENSIVE Collection Time: 04/01/19  5:45 PM  
Result Value Ref Range  Sodium 137 136 - 145 mmol/L  
 Potassium 2.7 (LL) 3.5 - 5.1 mmol/L Chloride 99 97 - 108 mmol/L  
 CO2 29 21 - 32 mmol/L Anion gap 9 5 - 15 mmol/L Glucose 157 (H) 65 - 100 mg/dL BUN 12 6 - 20 MG/DL Creatinine 0.69 0.55 - 1.02 MG/DL  
 BUN/Creatinine ratio 17 12 - 20 GFR est AA >60 >60 ml/min/1.73m2 GFR est non-AA >60 >60 ml/min/1.73m2 Calcium 9.0 8.5 - 10.1 MG/DL Bilirubin, total 0.4 0.2 - 1.0 MG/DL  
 ALT (SGPT) 26 12 - 78 U/L  
 AST (SGOT) 16 15 - 37 U/L Alk. phosphatase 66 45 - 117 U/L Protein, total 8.2 6.4 - 8.2 g/dL Albumin 3.7 3.5 - 5.0 g/dL Globulin 4.5 (H) 2.0 - 4.0 g/dL A-G Ratio 0.8 (L) 1.1 - 2.2 LIPASE Collection Time: 04/01/19  5:45 PM  
Result Value Ref Range Lipase 96 73 - 393 U/L  
HCG QL SERUM Collection Time: 04/01/19  6:06 PM  
Result Value Ref Range HCG, Ql. NEGATIVE  NEG    
POC CHEM8 Collection Time: 04/01/19  9:34 PM  
Result Value Ref Range Calcium, ionized (POC) 1.05 (L) 1.12 - 1.32 mmol/L Sodium (POC) 139 136 - 145 mmol/L Potassium (POC) 3.5 3.5 - 5.1 mmol/L Chloride (POC) 101 98 - 107 mmol/L  
 CO2 (POC) 26 21 - 32 mmol/L Anion gap (POC) 16 10 - 20 mmol/L Glucose (POC) 193 (H) 65 - 100 mg/dL BUN (POC) 12 9 - 20 mg/dL Creatinine (POC) 0.6 0.6 - 1.3 mg/dL GFRAA, POC >60 >60 ml/min/1.73m2 GFRNA, POC >60 >60 ml/min/1.73m2 Hematocrit (POC) 41 35.0 - 47.0 % Comment Comment Not Indicated. Radiologic Studies - No orders to display CT Results  (Last 48 hours) None CXR Results  (Last 48 hours) None Medical Decision Making I am the first provider for this patient. I reviewed the vital signs, available nursing notes, past medical history, past surgical history, family history and social history. Vital Signs-Reviewed the patient's vital signs. Patient Vitals for the past 12 hrs: 
 Temp Pulse Resp BP SpO2  
04/01/19 1912  76 19  100 % 04/01/19 1911  78 21 134/77  04/01/19 1903 98.9 °F (37.2 °C) 74 14 155/83 100 % 04/01/19 1713 98.2 °F (36.8 °C) 81 16 (!) 149/94 98 % Records Reviewed: Nursing Notes and Old Medical Records Provider Notes (Medical Decision Making): DDx: Gastroenteritis, viral illness, foodborne illness, pancreatitis, pregnancy, unlikely - appendicitis,  
 
ED Course:  
Initial assessment performed. The patients presenting problems have been discussed, and they are in agreement with the care plan formulated and outlined with them. I have encouraged them to ask questions as they arise throughout their visit. 6:32 PM 
Pt reports full resolution of sx and she is \"felling better\". Denies pain and nausea. Discussed pts labs including hypokalemia. Discussed need for IV potassium. All questions answered. Patient voiced she understands and agrees to treatment. 9:49 PM 
Repeat K: 3.5 Disposition: 
9:50 PM 
Discussed lab results with pt along with dx and treatment plan. Discussed importance of PCP follow up. All questions answered. Pt voiced they understood. Return if sx worsen. PLAN: 
1. Current Discharge Medication List  
  
 
2. Follow-up Information None Return to ED if worse Diagnosis Clinical Impression:  
1. Gastroenteritis 2. Hypokalemia

## 2019-04-01 NOTE — ED NOTES
Pt presents to ED with c/o vomiting, upper middle abdominal pain and diarrhea x 2 days. Stated she took zantac and ibuprofen PTA without relief. Reported she hasn't been able to keep foods or liquids down. Denies a hx of acid reflux. Reports having a  but no other abdominal surgeries. Pt is alert, oriented and appropriate. Mid upper abdominal area tender on palpation. Bowel sounds present in all 4 quadrants. Emergency Department Nursing Plan of Care The Nursing Plan of Care is developed from the Nursing assessment and Emergency Department Attending provider initial evaluation. The plan of care may be reviewed in the ED Provider note. The Plan of Care was developed with the following considerations:  
Patient / Family readiness to learn indicated by:verbalized understanding Persons(s) to be included in education: patient Barriers to Learning/Limitations:No 
 
Signed Marjean Drivers 2019   5:24 PM

## 2019-04-02 NOTE — ED NOTES
luis Chavez at bedside reviewing patient's discharge instructions and reviewing medications. Patient ambulatory home. Patient in no apparent distress. Unable to reassess pain

## 2019-04-02 NOTE — DISCHARGE INSTRUCTIONS
Patient Education        Gastroenteritis: Care Instructions  Your Care Instructions    Gastroenteritis is an illness that may cause nausea, vomiting, and diarrhea. It is sometimes called \"stomach flu. \" It can be caused by bacteria or a virus. You will probably begin to feel better in 1 to 2 days. In the meantime, get plenty of rest and make sure you do not become dehydrated. Dehydration occurs when your body loses too much fluid. Follow-up care is a key part of your treatment and safety. Be sure to make and go to all appointments, and call your doctor if you are having problems. It's also a good idea to know your test results and keep a list of the medicines you take. How can you care for yourself at home? · If your doctor prescribed antibiotics, take them as directed. Do not stop taking them just because you feel better. You need to take the full course of antibiotics. · Drink plenty of fluids to prevent dehydration, enough so that your urine is light yellow or clear like water. Choose water and other caffeine-free clear liquids until you feel better. If you have kidney, heart, or liver disease and have to limit fluids, talk with your doctor before you increase your fluid intake. · Drink fluids slowly, in frequent, small amounts, because drinking too much too fast can cause vomiting. · Begin eating mild foods, such as dry toast, yogurt, applesauce, bananas, and rice. Avoid spicy, hot, or high-fat foods, and do not drink alcohol or caffeine for a day or two. Do not drink milk or eat ice cream until you are feeling better. How to prevent gastroenteritis  · Keep hot foods hot and cold foods cold. · Do not eat meats, dressings, salads, or other foods that have been kept at room temperature for more than 2 hours. · Use a thermometer to check your refrigerator. It should be between 34°F and 40°F.  · Defrost meats in the refrigerator or microwave, not on the kitchen counter.   · Keep your hands and your kitchen clean. Wash your hands, cutting boards, and countertops with hot soapy water frequently. · Cook meat until it is well done. · Do not eat raw eggs or uncooked sauces made with raw eggs. · Do not take chances. If food looks or tastes spoiled, throw it out. When should you call for help? Call 911 anytime you think you may need emergency care. For example, call if:    · You vomit blood or what looks like coffee grounds.     · You passed out (lost consciousness).     · You pass maroon or very bloody stools.    Call your doctor now or seek immediate medical care if:    · You have severe belly pain.     · You have signs of needing more fluids. You have sunken eyes, a dry mouth, and pass only a little dark urine.     · You feel like you are going to faint.     · You have increased belly pain that does not go away in 1 to 2 days.     · You have new or increased nausea, or you are vomiting.     · You have a new or higher fever.     · Your stools are black and tarlike or have streaks of blood.    Watch closely for changes in your health, and be sure to contact your doctor if:    · You are dizzy or lightheaded.     · You urinate less than usual, or your urine is dark yellow or brown.     · You do not feel better with each day that goes by. Where can you learn more? Go to http://simin-saurabh.info/. Enter N142 in the search box to learn more about \"Gastroenteritis: Care Instructions. \"  Current as of: July 30, 2018  Content Version: 11.9  © 6979-5865 [employer]. Care instructions adapted under license by [employer] (which disclaims liability or warranty for this information). If you have questions about a medical condition or this instruction, always ask your healthcare professional. [place] [address] any warranty or liability for your use of this information.

## 2019-08-02 ENCOUNTER — OFFICE VISIT (OUTPATIENT)
Dept: ENDOCRINOLOGY | Age: 39
End: 2019-08-02

## 2019-08-02 VITALS
HEIGHT: 64 IN | HEART RATE: 99 BPM | WEIGHT: 241 LBS | SYSTOLIC BLOOD PRESSURE: 134 MMHG | DIASTOLIC BLOOD PRESSURE: 85 MMHG | BODY MASS INDEX: 41.15 KG/M2

## 2019-08-02 DIAGNOSIS — I10 ESSENTIAL HYPERTENSION: ICD-10-CM

## 2019-08-02 DIAGNOSIS — E11.9 TYPE 2 DIABETES MELLITUS WITHOUT COMPLICATION, WITHOUT LONG-TERM CURRENT USE OF INSULIN (HCC): Primary | ICD-10-CM

## 2019-08-02 DIAGNOSIS — E87.6 HYPOKALEMIA: ICD-10-CM

## 2019-08-02 LAB — HBA1C MFR BLD HPLC: 6.4 %

## 2019-08-02 NOTE — PROGRESS NOTES
History of Present Illness: Aditi Stafford is a 45 y.o. female presents for follow-up of diabetes. She has had diabetes since June 2015 - had gestational DM in 2012. .  A1c was 13.7 in summer 2015, then 6.1 in late 2015 and 5.7 in 2/2016.  6.3 and 9/9600     Complications: none     Current diabetes regimen:  Metformin ER 1000 mg twice daily - tolerating well  Was prescribed Ozempic, but did not take. Prefers to take less medications. Glucoses:  Not monitoring.      Diet  Water and tea (some sweet) are main beverages. .    Eating less.      Exercise: much more active with new job. Walking around a lot.      HTN:    Amlodipine/valsartan  Amiloride 5 mg x 2 daily. Indapamide 2.5 mg. Had hypokalemia during admission for gatroenteritis. Blood pressure is improved from 1/2018, but remains above goal.      BMI 42 down subtly    Social:  Has a 3 yo son who accompanies her today. [place]  at [employer]. Past Medical History:   Diagnosis Date    Advanced care planning/counseling discussion 6/1/16    Diabetes ([facility] [address] 6/2015    A1C 13    Gestational diabetes     HTN (hypertension)     Hyperlipidemia 1/2015    Migraines      Current Outpatient Medications   Medication Sig    amLODIPine-valsartan (EXFORGE) 5-320 mg per tablet TAKE 1 TABLET BY MOUTH EVERY EVENING    indapamide (LOZOL) 2.5 mg tablet Take 1 Tab by mouth daily. For blood pressure    aMILoride (MIDAMOR) 5 mg tablet TAKE 2 TABLETS BY MOUTH DAILY FOR BLOOD PRESSURE    metFORMIN ER (GLUCOPHAGE XR) 500 mg tablet Take 2 tablets twice daily after meals.  Lancets misc Please dispensed what is approved by her insurance company    glucose blood VI test strips (ASCENSIA AUTODISC VI, ONE TOUCH ULTRA TEST VI) strip For three times a day glucose checks.  One Touch Ultra II    Insulin Needles, Disposable, 31 gauge x 5/16\" ndle For use with insulin pen    ondansetron (ZOFRAN ODT) 4 mg disintegrating tablet Take 1 Tab by mouth every eight (8) hours as needed for Nausea.  semaglutide (OZEMPIC) 0.25 mg/0.2 mL (2 mg/1.5 mL) sub-q pen 0.5 mg by SubCUTAneous route every seven (7) days.  atenolol (TENORMIN) 25 mg tablet TAKE 1 TABLET BY MOUTH DAILY    Blood-Glucose Meter monitoring kit For up to 3 times a day glucose checks. One touch ultra II     No current facility-administered medications for this visit. Allergies   Allergen Reactions    Ace Inhibitors Hives       Review of Systems:  - Eyes: no blurry vision or double vision  - Cardiovascular: no chest pain  - Respiratory: no shortness of breath  - Musculoskeletal: no myalgias  - Neurological: no numbness/tingling in extremities    Physical Examination:  Visit Vitals  /85 (BP 1 Location: Left arm, BP Patient Position: Sitting)   Pulse 99   Ht 5' 4\" (1.626 m)   Wt 241 lb (109.3 kg)   BMI 41.37 kg/m²   -   - General: pleasant, no distress, normal gait   HEENT: hearing intact, EOMI, clear sclera without icterus  - Cardiovascular: regular, normal rate   - Respiratory: normal effort  - Integumentary: no edema  - Psychiatric: normal mood and affect    Data Reviewed:   Lab Results   Component Value Date/Time    Hemoglobin A1c 6.5 11/05/2018 12:00 AM      Lab Results   Component Value Date/Time    Sodium 137 04/01/2019 05:45 PM    Potassium 2.7 04/01/2019 05:45 PM    Creatinine 0.69 04/01/2019 05:45 PM      Component      Latest Ref Rng & Units 8/2/2019           2:32 PM   Hemoglobin A1c (POC)      % 6.4     No results found for: CHOL, HDL, LDLC, LDL, LDLCEXT, TRIGL   No results found for: TSH, FT4, TRALT, TMCLT, TSHEXT     Assessment/Plan:   1. Type 2 diabetes mellitus without complication, without long-term current use of insulin (HCC)   - controlled with metformin  Continue dietary and weight loss efforts. Encouraged continued efforts to improve diet. 2. BMI 40.0-44.9, adult [facility])  He is more active.  Encouraged him to focus on further reducing calorie intake by avoiding liquid calories and eating out less. 3. Hypokalemia   - reassess  May adjust amiloride or indapamide. 4. Essential hypertension   - BP acceptable. Continue current medications for now. There are no Patient Instructions on file for this visit.         Copy sent to:

## 2019-08-03 LAB
ALBUMIN SERPL-MCNC: 4.3 G/DL (ref 3.5–5.5)
ALBUMIN/CREAT UR: 2.5 MG/G CREAT (ref 0–30)
ALBUMIN/GLOB SERPL: 1.4 {RATIO} (ref 1.2–2.2)
ALP SERPL-CCNC: 60 IU/L (ref 39–117)
ALT SERPL-CCNC: 20 IU/L (ref 0–32)
AST SERPL-CCNC: 14 IU/L (ref 0–40)
BILIRUB SERPL-MCNC: <0.2 MG/DL (ref 0–1.2)
BUN SERPL-MCNC: 12 MG/DL (ref 6–20)
BUN/CREAT SERPL: 17 (ref 9–23)
CALCIUM SERPL-MCNC: 9.7 MG/DL (ref 8.7–10.2)
CHLORIDE SERPL-SCNC: 99 MMOL/L (ref 96–106)
CHOLEST SERPL-MCNC: 201 MG/DL (ref 100–199)
CO2 SERPL-SCNC: 22 MMOL/L (ref 20–29)
CREAT SERPL-MCNC: 0.71 MG/DL (ref 0.57–1)
CREAT UR-MCNC: 161.3 MG/DL
GLOBULIN SER CALC-MCNC: 3.1 G/DL (ref 1.5–4.5)
GLUCOSE SERPL-MCNC: 150 MG/DL (ref 65–99)
HDLC SERPL-MCNC: 39 MG/DL
LDLC SERPL CALC-MCNC: 133 MG/DL (ref 0–99)
MAGNESIUM SERPL-MCNC: 1.8 MG/DL (ref 1.6–2.3)
MICROALBUMIN UR-MCNC: 4 UG/ML
POTASSIUM SERPL-SCNC: 3.7 MMOL/L (ref 3.5–5.2)
PROT SERPL-MCNC: 7.4 G/DL (ref 6–8.5)
SODIUM SERPL-SCNC: 136 MMOL/L (ref 134–144)
TRIGL SERPL-MCNC: 146 MG/DL (ref 0–149)
VLDLC SERPL CALC-MCNC: 29 MG/DL (ref 5–40)

## 2019-09-16 RX ORDER — METFORMIN HYDROCHLORIDE 500 MG/1
TABLET, EXTENDED RELEASE ORAL
Qty: 120 TAB | Refills: 12 | Status: SHIPPED | OUTPATIENT
Start: 2019-09-16 | End: 2020-10-29

## 2019-10-29 RX ORDER — AMILORIDE HYDROCHLORIDE 5 MG/1
TABLET ORAL
Qty: 180 TAB | Refills: 3 | Status: SHIPPED | OUTPATIENT
Start: 2019-10-29 | End: 2022-02-01 | Stop reason: ALTCHOICE

## 2019-10-29 NOTE — TELEPHONE ENCOUNTER
10/29/2019 
11:36 AM 
 
 
Ms. Ford need a refill for aMILoride (MIDAMOR) 5 mg tablet [facility] [address]  [place] Thanks

## 2019-12-17 LAB — HBA1C MFR BLD HPLC: 6.4 %

## 2020-01-14 DIAGNOSIS — I10 ESSENTIAL HYPERTENSION: ICD-10-CM

## 2020-01-14 RX ORDER — INDAPAMIDE 2.5 MG/1
TABLET, FILM COATED ORAL
Qty: 90 TAB | Refills: 3 | Status: SHIPPED | OUTPATIENT
Start: 2020-01-14 | End: 2021-01-18

## 2020-03-15 NOTE — PATIENT INSTRUCTIONS
A&OX3 c/o possible overdose, pt admits to taking oxycodone, unsure of the amount bottle was empty  states I took one this morning and one tonight pt appears confused and lethargic  denies cp sob n/v/d Diabetes and weight. Increase exercise  Avoid liquid calories. Medications:  Continue metformin  -1000 mg twice daily    Add Ozempic - 0.25 mg weekly for 4 weeks, then increase to 0.5 mg weekly for 4-6 weeks. After taking 0.5 mg for 4 weeks, can increase to 1.0 mg for greater effect on glucoses and weight loss. Work on efforts to lose weight and increase exercise. Blood pressure:  Continue atenolol   Continue valsartan/amlodipine 320/5 mg daily  Continue ndapamide 2.5 mg daily    Liver enzyme elevation and high triglycerides and HDL  - less sugar intake, weight loss, exercise will help.

## 2020-05-06 ENCOUNTER — VIRTUAL VISIT (OUTPATIENT)
Dept: ENDOCRINOLOGY | Age: 40
End: 2020-05-06

## 2020-05-06 ENCOUNTER — TELEPHONE (OUTPATIENT)
Dept: ENDOCRINOLOGY | Age: 40
End: 2020-05-06

## 2020-05-06 DIAGNOSIS — I10 ESSENTIAL HYPERTENSION: ICD-10-CM

## 2020-05-06 DIAGNOSIS — E11.9 TYPE 2 DIABETES MELLITUS WITHOUT COMPLICATION, WITHOUT LONG-TERM CURRENT USE OF INSULIN (HCC): Primary | ICD-10-CM

## 2020-05-06 RX ORDER — AMLODIPINE AND VALSARTAN 5; 320 MG/1; MG/1
1 TABLET ORAL DAILY
Qty: 90 TAB | Refills: 3 | Status: SHIPPED | OUTPATIENT
Start: 2020-05-06 | End: 2021-04-27 | Stop reason: SDUPTHER

## 2020-05-06 NOTE — PROGRESS NOTES
This is an established visit conducted via telemedicine using [employer] video. The patient has been instructed that this meets HIPAA criteria and acknowledges and agrees to this method of visitation. Paris Caballero is a 45 y.o. female presents for follow-up of diabetes x5 years. She was previously followed by Dr. Wali Quintana and saw him approximately 6 months ago. She has had diabetes since June 2015 - had gestational DM in 2012. .  A1c was 13.7 in summer 2015, then 6.1 in late 2015 and 5.7 in 2/2016.  6.3 1/2018 and 6.4% in December 2019     Current diabetes regimen:  Metformin ER 1000 mg twice daily - tolerating well  Was prescribed Ozempic, but did not take it because of side effects. Prefers to take less medications.      She does not check blood sugars but had a recent A1c of 6.4%. She is trying to lose weight with a goal weight of 200 pounds. Currently she weighs 239 pounds. Breakfast is sausage and eggs. Lunch is a chicken fajita and beans. Last night for dinner she had chicken potatoes and vegetables. She tries to cook a healthy meal for her 10year-old son. She has a bit of a sweet tooth and if she is going to have sweets it usually at bedtime. She says she sleeps okay. She denies chest pain, shortness of breath, constipation diarrhea or yeast infections. Amlodipine/valsartan  Amiloride 5 mg x 2 daily. Indapamide 2.5 mg.     GENERAL: NCAT, Appears well nourished   EYES: EOMI, non-icteric, no proptosis   Ear/Nose/Throat: NCAT, no visible inflammation or masses   CARDIOVASCULAR: no cyanosis, no visible JVD   RESPIRATORY: comfortable respirations observed, no cyanosis   MUSCULOSKELETAL: Normal ROM of upper extremities observed   SKIN: No edema, rash, or other significant changes observed   NEUROLOGIC:  AAOx3   PSYCHIATRIC: Normal affect, Normal insight and judgement       Impression type 2 diabetes mellitus x5+ years with an A1c of 6.4% in December. Plan:  We will continue the current regimen of metformin only which she is doing well with. Her goal is to lose 40 pounds. We set a goal of 40 pounds by December which would be almost 1 pound a week. We will see her back in 4 to 5 months and hopefully she will be on track. She would like to get off all medications. I do think continued metformin is advisable.

## 2020-05-06 NOTE — TELEPHONE ENCOUNTER
----- Message from Germain Washington sent at 5/6/2020  9:21 AM EDT ----- Regarding: Dr. Yovana Sharma Patient return call Caller's first and last name and relationship (if not the patient): Patient Best contact number(s): [phone] Whose call is being returned: office Details to clarify the request: Patient is returning the office's call about appt today at 9:10 Germain Washington

## 2020-08-31 RX ORDER — AMILORIDE HYDROCHLORIDE 5 MG/1
TABLET ORAL
Qty: 180 TAB | Refills: 2 | Status: SHIPPED | OUTPATIENT
Start: 2020-08-31 | End: 2021-05-21

## 2020-10-16 ENCOUNTER — VIRTUAL VISIT (OUTPATIENT)
Dept: ENDOCRINOLOGY | Age: 40
End: 2020-10-16
Payer: COMMERCIAL

## 2020-10-16 DIAGNOSIS — E11.9 TYPE 2 DIABETES MELLITUS WITHOUT COMPLICATION, WITHOUT LONG-TERM CURRENT USE OF INSULIN (HCC): Primary | ICD-10-CM

## 2020-10-16 PROCEDURE — 99213 OFFICE O/P EST LOW 20 MIN: CPT | Performed by: INTERNAL MEDICINE

## 2020-10-16 RX ORDER — INSULIN PUMP SYRINGE, 3 ML
EACH MISCELLANEOUS
Qty: 1 KIT | Refills: 0 | Status: SHIPPED | OUTPATIENT
Start: 2020-10-16 | End: 2022-02-01

## 2020-10-16 RX ORDER — IBUPROFEN 200 MG
CAPSULE ORAL
Qty: 100 STRIP | Refills: 3 | Status: SHIPPED | OUTPATIENT
Start: 2020-10-16 | End: 2021-07-16 | Stop reason: SDUPTHER

## 2020-10-16 RX ORDER — LANCETS
EACH MISCELLANEOUS
Qty: 100 EACH | Refills: 3 | Status: SHIPPED | OUTPATIENT
Start: 2020-10-16 | End: 2022-02-01

## 2020-10-16 NOTE — PROGRESS NOTES
This is an established visit conducted via telemedicine using [employer] video. The patient has been instructed that this meets HIPAA criteria ,that they may receive a bill for these services and acknowledges and agrees to this method of visitation. Digna Caballero a 45 y. o. female presents for follow-up of diabetes x5 years. She was previously followed by Dr. Soledad Quiñonez and saw him approximately 6 months ago. She has had diabetes since June 2015 - had gestational DM in 2012. .  A1c was 13.7 in summer 2015, then 6.1 in late 2015 and 5.7 in 2/2016.  6.3 1/2018 and 6.4% in December 2019      Current diabetes regimen:  Metformin ER 1000 mg twice daily - tolerating well  Was prescribed Ozempic, but did not take it because of side effects. Prefers to take less medications.      She does not check blood sugars. Her last A1c was 6.4% in December. She is trying to lose weight with a goal weight of 200 pounds. Currently she weighs 239 pounds. Her weight is really not changed from May until today. She says she actually increased weight up to 246 and has come back to 239 currently. Breakfast is sausage and eggs. Lunch is a chicken fajita and beans. Last night for dinner she had chicken potatoes and vegetables. She tries to cook a healthy meal for her 10year-old son. She says she sleeps okay. She denies chest pain, shortness of breath, constipation diarrhea or yeast infections. She has been trying to go to increase her physical activity in the form of walking or cardio at home. She does not go to a gym. She is trying to decrease the bread in her diet but she does eat potatoes and rice. Her sweets are usually just fruit although occasionally she will have chocolate at night.     Examination  GENERAL: NCAT, Appears well nourished   EYES: EOMI, non-icteric, no proptosis   Ear/Nose/Throat: NCAT, no visible inflammation or masses   CARDIOVASCULAR: no cyanosis, no visible JVD   RESPIRATORY: comfortable respirations observed, no cyanosis   MUSCULOSKELETAL: Normal ROM of upper extremities observed   SKIN: No edema, rash, or other significant changes observed   NEUROLOGIC:  AAOx3   PSYCHIATRIC: Normal affect, Normal insight and judgement       Impression type 2 diabetes mellitus with indeterminate glucose control on Metformin at 1000 mg twice daily. Plan: I have asked her to start checking her daily blood sugar so we can be sure that her blood sugars are in an acceptable range. She says she sometimes feels like her blood sugar is low but she is not checked it and so she is not sure. I certainly do not want her to eat when she is not low.   At her request, we will see her back in 6 months.

## 2020-10-29 RX ORDER — METFORMIN HYDROCHLORIDE 500 MG/1
TABLET, EXTENDED RELEASE ORAL
Qty: 120 TAB | Refills: 12 | Status: SHIPPED | OUTPATIENT
Start: 2020-10-29 | End: 2021-11-08

## 2021-01-17 DIAGNOSIS — I10 ESSENTIAL HYPERTENSION: ICD-10-CM

## 2021-01-18 RX ORDER — INDAPAMIDE 2.5 MG/1
TABLET, FILM COATED ORAL
Qty: 90 TAB | Refills: 3 | Status: SHIPPED | OUTPATIENT
Start: 2021-01-18 | End: 2021-10-23

## 2021-03-15 ENCOUNTER — TRANSCRIBE ORDER (OUTPATIENT)
Dept: SCHEDULING | Age: 41
End: 2021-03-15

## 2021-03-15 DIAGNOSIS — Z12.31 VISIT FOR SCREENING MAMMOGRAM: Primary | ICD-10-CM

## 2021-04-27 DIAGNOSIS — I10 ESSENTIAL HYPERTENSION: ICD-10-CM

## 2021-04-27 RX ORDER — AMLODIPINE AND VALSARTAN 5; 320 MG/1; MG/1
1 TABLET ORAL DAILY
Qty: 90 TAB | Refills: 3 | Status: SHIPPED | OUTPATIENT
Start: 2021-04-27 | End: 2022-02-01

## 2021-04-27 NOTE — TELEPHONE ENCOUNTER
Requested Prescriptions Pending Prescriptions Disp Refills  amLODIPine-valsartan (EXFORGE) 5-320 mg per tablet 90 Tab 3 Sig: Take 1 Tab by mouth daily.

## 2021-05-14 ENCOUNTER — VIRTUAL VISIT (OUTPATIENT)
Dept: ENDOCRINOLOGY | Age: 41
End: 2021-05-14
Payer: COMMERCIAL

## 2021-05-14 DIAGNOSIS — E11.9 TYPE 2 DIABETES MELLITUS WITHOUT COMPLICATION, WITHOUT LONG-TERM CURRENT USE OF INSULIN (HCC): ICD-10-CM

## 2021-05-14 DIAGNOSIS — E11.9 TYPE 2 DIABETES MELLITUS WITHOUT COMPLICATION, WITHOUT LONG-TERM CURRENT USE OF INSULIN (HCC): Primary | ICD-10-CM

## 2021-05-14 PROCEDURE — 99214 OFFICE O/P EST MOD 30 MIN: CPT | Performed by: INTERNAL MEDICINE

## 2021-05-14 NOTE — PROGRESS NOTES
This is an established visit conducted via telemedicine using [employer] video. The patient has been instructed that this meets HIPAA criteria ,that they may receive a bill for these services and acknowledges and agrees to this method of visitation.     Karen Ford is a 40 y.o. female presents for follow-up of diabetes x7 years. Juan Carlos Johnston was previously followed by Dr. Sofia Quintero. She has had diabetes since June 2015 - had gestational DM in 2012. .  A1c was 13.7 in summer 2015, then 6.1 in late 2015 and 6.4% in December 2019. Was prescribed Ozempic, but did not take it because of side effects.     Current diabetes regimen:  Metformin ER 1000 mg twice daily - tolerating well  Prefers to take less medications.      She does not check blood sugars. Her last A1c was 6.4% in December 2019. I have not seen her since November 2020 and that was a virtual visit. She tells me she is not been checking blood sugars very much but she is starting to see numbers in the 140-160 range when she does check. She also admits that she has fallen off the wagon in terms of sweet tea and regular soda. She has 2 or 3 a day of these drinks. She does not eat breakfast now. Lunch is a sandwich or a wrap. Last night for dinner she had fried chicken pancakes and a regular Sprite. She also had a candy bar last night which she started back as well. Her blood sugar this morning was 160. Had been going to the gym but she stopped for the last 6 months. Because the blood sugars are beginning to increase, she has gone back twice in the last month. She denies chest pain, shortness of breath, constipation or diarrhea.   Examination  Weight by report 235 pounds  GENERAL: NCAT, Appears well nourished   EYES: EOMI, non-icteric, no proptosis   Ear/Nose/Throat: NCAT, no visible inflammation or masses   CARDIOVASCULAR: no cyanosis, no visible JVD   RESPIRATORY: comfortable respirations observed, no cyanosis   MUSCULOSKELETAL: Normal ROM of upper extremities observed   SKIN: No edema, rash, or other significant changes observed   NEUROLOGIC:  AAOx3   PSYCHIATRIC: Normal affect, Normal insight and judgement       Impression  1. Type 2 diabetes mellitus with worsening glucose control  2. History of hypertension on combination antihypertensives  3. Obesity    Plan:  1. I ordered all laboratory tests and I will call her with the results.   2.  I will see her back in 2 months face-to-face

## 2021-05-19 LAB
ALBUMIN SERPL-MCNC: 4.4 G/DL (ref 3.8–4.8)
ALBUMIN/GLOB SERPL: 1.5 {RATIO} (ref 1.2–2.2)
ALP SERPL-CCNC: 62 IU/L (ref 48–121)
ALT SERPL-CCNC: 18 IU/L (ref 0–32)
AST SERPL-CCNC: 16 IU/L (ref 0–40)
BILIRUB SERPL-MCNC: 0.4 MG/DL (ref 0–1.2)
BUN SERPL-MCNC: 12 MG/DL (ref 6–24)
BUN/CREAT SERPL: 16 (ref 9–23)
CALCIUM SERPL-MCNC: 9.4 MG/DL (ref 8.7–10.2)
CHLORIDE SERPL-SCNC: 101 MMOL/L (ref 96–106)
CHOLEST SERPL-MCNC: 164 MG/DL (ref 100–199)
CO2 SERPL-SCNC: 24 MMOL/L (ref 20–29)
CREAT SERPL-MCNC: 0.73 MG/DL (ref 0.57–1)
EST. AVERAGE GLUCOSE BLD GHB EST-MCNC: 126 MG/DL
GLOBULIN SER CALC-MCNC: 2.9 G/DL (ref 1.5–4.5)
GLUCOSE SERPL-MCNC: 131 MG/DL (ref 65–99)
HBA1C MFR BLD: 6 % (ref 4.8–5.6)
HDLC SERPL-MCNC: 38 MG/DL
IMP & REVIEW OF LAB RESULTS: NORMAL
LDLC SERPL CALC-MCNC: 111 MG/DL (ref 0–99)
POTASSIUM SERPL-SCNC: 3.5 MMOL/L (ref 3.5–5.2)
PROT SERPL-MCNC: 7.3 G/DL (ref 6–8.5)
SODIUM SERPL-SCNC: 139 MMOL/L (ref 134–144)
TRIGL SERPL-MCNC: 76 MG/DL (ref 0–149)
VLDLC SERPL CALC-MCNC: 15 MG/DL (ref 5–40)

## 2021-05-21 RX ORDER — AMILORIDE HYDROCHLORIDE 5 MG/1
TABLET ORAL
Qty: 180 TABLET | Refills: 2 | Status: SHIPPED | OUTPATIENT
Start: 2021-05-21 | End: 2021-12-11

## 2021-06-05 ENCOUNTER — HOSPITAL ENCOUNTER (OUTPATIENT)
Dept: MAMMOGRAPHY | Age: 41
Discharge: HOME | End: 2021-06-05
Attending: PHYSICIAN ASSISTANT
Payer: COMMERCIAL

## 2021-06-05 DIAGNOSIS — Z12.31 VISIT FOR SCREENING MAMMOGRAM: ICD-10-CM

## 2021-06-05 PROCEDURE — 77067 SCR MAMMO BI INCL CAD: CPT

## 2021-07-16 ENCOUNTER — OFFICE VISIT (OUTPATIENT)
Dept: ENDOCRINOLOGY | Age: 41
End: 2021-07-16
Payer: COMMERCIAL

## 2021-07-16 VITALS
HEART RATE: 72 BPM | HEIGHT: 64 IN | WEIGHT: 230 LBS | DIASTOLIC BLOOD PRESSURE: 85 MMHG | SYSTOLIC BLOOD PRESSURE: 153 MMHG | BODY MASS INDEX: 39.27 KG/M2

## 2021-07-16 DIAGNOSIS — E11.9 TYPE 2 DIABETES MELLITUS WITHOUT COMPLICATION, WITHOUT LONG-TERM CURRENT USE OF INSULIN (HCC): Primary | ICD-10-CM

## 2021-07-16 PROCEDURE — 99214 OFFICE O/P EST MOD 30 MIN: CPT | Performed by: INTERNAL MEDICINE

## 2021-07-16 RX ORDER — IBUPROFEN 200 MG
CAPSULE ORAL
Qty: 100 STRIP | Refills: 3 | Status: SHIPPED | OUTPATIENT
Start: 2021-07-16 | End: 2022-02-01

## 2021-07-16 RX ORDER — LANCETS
EACH MISCELLANEOUS
Qty: 100 EACH | Refills: 3 | Status: SHIPPED | OUTPATIENT
Start: 2021-07-16 | End: 2022-02-01

## 2021-07-16 NOTE — PROGRESS NOTES
Meg Cassette a 36 y.o. female presents for follow-up of diabetes x7 years. Giovanni Saucedo was previously followed by Dr. Brittany Ayala. She has had diabetes since June 2015 - had gestational DM in 2012. .  A1c was 13.7 in summer 2015, then 6.1 in late 2015 and 6.4% in December 2019. Was prescribed Ozempic, but did not take it because of side effects. This is my  first face-to-face visit with her.     Current diabetes regimen:  Metformin ER 1000 mg twice daily - tolerating well  Prefers to take less medications.      She does not check blood sugars.  Her last A1c was 6.4% in December 2019. Her A1c in May 2021 was 6.0%.    She admits that she has fallen off the wagon in terms of sweet tea and regular soda. She has 2 or 3 a day of these drinks. She does not eat breakfast now. Lunch is a sandwich or a wrap. Last night for dinner she had fried chicken pancakes and a regular Sprite. She also had a candy bar last night which she started back as well. Her blood sugar this morning was 138. She had stopped going to the gym for a while but is back into the habit. She tells me she works out for 30 to 45 minutes almost every evening before dinner. Examination  Blood pressure 153/85  Pulse 80  Weight 230  BMI 39.5  HEENT unremarkable. The thyroid is normal.  Lungs clear  Heart reveals a regular rate and rhythm  Abdomen obese  Extremities unremarkable  Diabetic foot exam:     Left Foot:   Visual Exam: normal    Pulse DP: 2+ (normal)   Filament test: normal sensation    Vibratory sensation: normal      Right Foot:   Visual Exam: normal    Pulse DP: 2+ (normal)   Filament test: normal sensation    Vibratory sensation: normal    Impression   1. type 2 diabetes mellitus with her most recent A1c of 6.0%  2. Obesity    Plan:  1. I have encouraged her lifestyle strategies which are working. 2.  I did discourage the regular sodas which she continues to drink  3. I will see her back in 6 months or sooner as needed.

## 2021-10-23 DIAGNOSIS — I10 ESSENTIAL HYPERTENSION: ICD-10-CM

## 2021-10-23 RX ORDER — INDAPAMIDE 2.5 MG/1
TABLET, FILM COATED ORAL
Qty: 90 TABLET | Refills: 3 | Status: SHIPPED | OUTPATIENT
Start: 2021-10-23 | End: 2022-10-19

## 2021-12-11 RX ORDER — AMILORIDE HYDROCHLORIDE 5 MG/1
TABLET ORAL
Qty: 180 TABLET | Refills: 2 | Status: SHIPPED | OUTPATIENT
Start: 2021-12-11 | End: 2022-02-01

## 2022-02-01 ENCOUNTER — CLINICAL SUPPORT (OUTPATIENT)
Dept: CARDIOLOGY CLINIC | Age: 42
End: 2022-02-01
Payer: COMMERCIAL

## 2022-02-01 ENCOUNTER — OFFICE VISIT (OUTPATIENT)
Dept: CARDIOLOGY CLINIC | Age: 42
End: 2022-02-01

## 2022-02-01 VITALS
BODY MASS INDEX: 37.22 KG/M2 | HEART RATE: 72 BPM | RESPIRATION RATE: 18 BRPM | DIASTOLIC BLOOD PRESSURE: 82 MMHG | SYSTOLIC BLOOD PRESSURE: 134 MMHG | OXYGEN SATURATION: 98 % | HEIGHT: 64 IN | WEIGHT: 218 LBS

## 2022-02-01 DIAGNOSIS — E11.8 CONTROLLED DIABETES MELLITUS TYPE 2 WITH COMPLICATIONS, UNSPECIFIED WHETHER LONG TERM INSULIN USE (HCC): ICD-10-CM

## 2022-02-01 DIAGNOSIS — I10 BENIGN ESSENTIAL HTN: Primary | ICD-10-CM

## 2022-02-01 DIAGNOSIS — Z72.0 TOBACCO ABUSE: ICD-10-CM

## 2022-02-01 DIAGNOSIS — R00.2 INTERMITTENT PALPITATIONS: Primary | ICD-10-CM

## 2022-02-01 DIAGNOSIS — I10 ESSENTIAL HYPERTENSION: ICD-10-CM

## 2022-02-01 DIAGNOSIS — R00.2 INTERMITTENT PALPITATIONS: ICD-10-CM

## 2022-02-01 DIAGNOSIS — E78.2 MIXED HYPERLIPIDEMIA: ICD-10-CM

## 2022-02-01 PROCEDURE — 99204 OFFICE O/P NEW MOD 45 MIN: CPT | Performed by: INTERNAL MEDICINE

## 2022-02-01 PROCEDURE — 93270 REMOTE 30 DAY ECG REV/REPORT: CPT | Performed by: INTERNAL MEDICINE

## 2022-02-01 PROCEDURE — 93272 ECG/REVIEW INTERPRET ONLY: CPT | Performed by: INTERNAL MEDICINE

## 2022-02-01 PROCEDURE — 93000 ELECTROCARDIOGRAM COMPLETE: CPT | Performed by: INTERNAL MEDICINE

## 2022-02-01 RX ORDER — LOSARTAN POTASSIUM 25 MG/1
25 TABLET ORAL DAILY
Qty: 90 TABLET | Refills: 1 | Status: SHIPPED | OUTPATIENT
Start: 2022-02-01 | End: 2022-05-01

## 2022-02-01 RX ORDER — AMILORIDE HYDROCHLORIDE 5 MG/1
10 TABLET ORAL DAILY
Qty: 180 TABLET | Refills: 1 | Status: SHIPPED | OUTPATIENT
Start: 2022-02-01 | End: 2022-02-01 | Stop reason: SINTOL

## 2022-02-01 RX ORDER — DROSPIRENONE 4 MG/1
TABLET, FILM COATED ORAL
COMMUNITY
Start: 2022-01-31

## 2022-02-01 NOTE — PROGRESS NOTES
1. Have you been to the ER, urgent care clinic since your last visit? Hospitalized since your last visit? No    2. Have you seen or consulted any other health care providers outside of the [address] [place] [address] since your last visit? Include any pap smears or colon screening.  No     Chief Complaint   Patient presents with    New Patient     hypertension    Palpitations     happens at random times/short period

## 2022-02-01 NOTE — PROGRESS NOTES
[address]  [phone]     Subjective:      Daya Woo is a 39 y.o. female is here for new patient consultation:self referred for intermittent palpitation and HTN management. Pmhx HTN, HLD and DM. Current 1 PPD smoker, occasional drinker in moderation, no illegal drug use. No family hx CAD. She reports intermittent palpitation , heart skipping beats. Has been going on > 1 yr but never got it checked. She works out 5-6 days a week, combination of cardio and [employer] No cp or sob. Follows with Dr Anton Elmore for endo. The patient denies chest pain/ shortness of breath, orthopnea, PND, LE edema, palpitations, syncope, or presyncope.        Patient Active Problem List    Diagnosis Date Noted    Cigarette smoker one half pack a day or less 10/26/2015    Genital herpes 10/26/2015    Type II diabetes mellitus, uncontrolled ([address] 10/26/2015    Tinea versicolor 2015    Hypertension 2015    Obesity, Class III, BMI 40-49.9 (morbid obesity) ([facility] 2015      None  Past Medical History:   Diagnosis Date    Advanced care planning/counseling discussion 16    Diabetes ([address] 2015    A1C 15    Gestational diabetes     HTN (hypertension)     Hyperlipidemia 2015    Migraines     Murmur       Past Surgical History:   Procedure Laterality Date    HX  SECTION  2014    HX GYN      Implant - Nexplanon    HX WISDOM TEETH EXTRACTION       Allergies   Allergen Reactions    Ace Inhibitors Hives      Family History   Problem Relation Age of Onset    Diabetes Mother 39        on insulin now    Hypertension Father     High Cholesterol Father     Diabetes Sister 32        type 2    Diabetes Maternal Grandmother     Other Maternal Grandfather         MVA    Diabetes Paternal Grandmother     Cancer Paternal Grandfather         unknown      Social History     Socioeconomic History    Marital status: SINGLE Spouse name: Not on file    Number of children: Not on file    Years of education: Not on file    Highest education level: Not on file   Occupational History    Not on file   Tobacco Use    Smoking status: Current Every Day Smoker     Packs/day: 0.25    Smokeless tobacco: Never Used    Tobacco comment: reports she is cutting back   Substance and Sexual Activity    Alcohol use: Yes     Comment: every other weekend    Drug use: Yes     Types: Marijuana    Sexual activity: Never   Other Topics Concern    Not on file   Social History Narrative    Not on file     Social Determinants of Health     Financial Resource Strain:     Difficulty of Paying Living Expenses: Not on file   Food Insecurity:     Worried About Running Out of Food in the Last Year: Not on file    Sean of Food in the Last Year: Not on file   Transportation Needs:     Lack of Transportation (Medical): Not on file    Lack of Transportation (Non-Medical):  Not on file   Physical Activity:     Days of Exercise per Week: Not on file    Minutes of Exercise per Session: Not on file   Stress:     Feeling of Stress : Not on file   Social Connections:     Frequency of Communication with Friends and Family: Not on file    Frequency of Social Gatherings with Friends and Family: Not on file    Attends [employer] Services: Not on file    Active Member of [address] [place] [address] [employer] or Organizations: Not on file    Attends Club or Organization Meetings: Not on file    Marital Status: Not on file   Intimate Partner Violence:     Fear of Current or Ex-Partner: Not on file    Emotionally Abused: Not on file    Physically Abused: Not on file    Sexually Abused: Not on file   Housing Stability:     Unable to Pay for Housing in the Last Year: Not on file    Number of [place] in the Last Year: Not on file    Unstable Housing in the Last Year: Not on file      Current Outpatient Medications   Medication Sig    Slynd 4 mg (28) tab     metFORMIN ER (GLUCOPHAGE XR) 500 mg tablet TAKE 2 TABLETS BY MOUTH TWICE DAILY AFTER MEALS    indapamide (LOZOL) 2.5 mg tablet TAKE 1 TABLET BY MOUTH DAILY FOR BLOOD PRESSURE    aMILoride (MIDAMOR) 5 mg tablet TAKE 2 TABLETS BY MOUTH DAILY FOR BLOOD PRESSURE     No current facility-administered medications for this visit. Review of Symptoms:  11 systems reviewed, negative other than as stated in the HPI    Physical ExamPhysical Exam:    Vitals:    02/01/22 1012   BP: (!) 140/90   Pulse: 72   Resp: 18   SpO2: 98%   Weight: 218 lb (98.9 kg)   Height: 5' 4\" (1.626 m)     Body mass index is 37.42 kg/m². General PE  Gen:  NAD  Mental Status - Alert. General Appearance - Not in acute distress. HEENT:  PERRL, no carotid bruits or JVD  Chest and Lung Exam   Inspection: Accessory muscles - No use of accessory muscles in breathing. Auscultation:   Breath sounds: - Normal.   Cardiovascular   Inspection: Jugular vein - Bilateral - Inspection Normal.   Palpation/Percussion:   Apical Impulse: - Normal.   Auscultation: Rhythm - Regular. Heart Sounds - S1 WNL and S2 WNL. No S3 or S4. Murmurs & Other Heart Sounds: Auscultation of the heart reveals - No Murmurs. Peripheral Vascular   Upper Extremity: Inspection - Bilateral - No Cyanotic nailbeds or Digital clubbing. Lower Extremity:   Palpation: Edema - Bilateral - No edema. Abdomen:   Soft, non-tender, bowel sounds are active.   Neuro: A&O times 3, CN and motor grossly WNL    Labs:   Lab Results   Component Value Date/Time    Cholesterol, total 164 05/18/2021 08:44 AM    Cholesterol, total 201 (H) 08/02/2019 03:51 PM    Cholesterol, total 165 01/18/2018 01:27 PM    Cholesterol, total 186 02/16/2015 09:18 AM    HDL Cholesterol 38 (L) 05/18/2021 08:44 AM    HDL Cholesterol 39 (L) 08/02/2019 03:51 PM    HDL Cholesterol 37 (L) 01/18/2018 01:27 PM    HDL Cholesterol 44 02/16/2015 09:18 AM    LDL, calculated 111 (H) 05/18/2021 08:44 AM    LDL, calculated 133 (H) 08/02/2019 03:51 PM    LDL, calculated 87 01/18/2018 01:27 PM    LDL, calculated 122 (H) 02/16/2015 09:18 AM    Triglyceride 76 05/18/2021 08:44 AM    Triglyceride 146 08/02/2019 03:51 PM    Triglyceride 205 (H) 01/18/2018 01:27 PM    Triglyceride 102 02/16/2015 09:18 AM     No results found for: CPK, CPKX, CPX  Lab Results   Component Value Date/Time    Sodium 139 05/18/2021 08:44 AM    Potassium 3.5 05/18/2021 08:44 AM    Chloride 101 05/18/2021 08:44 AM    CO2 24 05/18/2021 08:44 AM    Anion gap 9 04/01/2019 05:45 PM    Glucose 131 (H) 05/18/2021 08:44 AM    BUN 12 05/18/2021 08:44 AM    Creatinine 0.73 05/18/2021 08:44 AM    BUN/Creatinine ratio 16 05/18/2021 08:44 AM    GFR est  05/18/2021 08:44 AM    GFR est non- 05/18/2021 08:44 AM    Calcium 9.4 05/18/2021 08:44 AM    Bilirubin, total 0.4 05/18/2021 08:44 AM    Alk. phosphatase 62 05/18/2021 08:44 AM    Protein, total 7.3 05/18/2021 08:44 AM    Albumin 4.4 05/18/2021 08:44 AM    Globulin 4.5 (H) 04/01/2019 05:45 PM    A-G Ratio 1.5 05/18/2021 08:44 AM    ALT (SGPT) 18 05/18/2021 08:44 AM       EKG:  NSR      Assessment:     Assessment:        ICD-10-CM ICD-9-CM    1. Benign essential HTN  I10 401.1 AMB POC EKG ROUTINE W/ 12 LEADS, INTER & REP   2. Mixed hyperlipidemia  E78.2 272.2    3. Controlled diabetes mellitus type 2 with complications, unspecified whether long term insulin use (HCC)  E11.8 250.90    4. Tobacco abuse  Z72.0 305.1    5. Intermittent palpitations  R00.2 785.1        Orders Placed This Encounter    AMB POC EKG ROUTINE W/ 12 LEADS, INTER & REP     Order Specific Question:   Reason for Exam:     Answer:   routine    Slynd 4 mg (28) tab        Plan:       1. Intermittent palpitations  Obtain echo and 2 week event    2. Benign essential HTN  Will stop amiloride and start losartan 25 mg daily  Continue indapamide  Stable kidney fxn in 5/2021    3. Mixed hyperlipidemia  5/2021  Labs and lipids per endo    4.  Controlled diabetes mellitus type 2 with complications, unspecified whether long term insulin use (HCC)  On oral agent, followed by endo      5. Tobacco abuse  Tobacco cessation discussed with patient    6. Discussed importance of diet and exercise - eventual goal of 30 - 60 minutes, 5-7 times a week as per AHA guideline. Goal of 10 % (20 lbs) weight loss for 6 months. She is already exercising 5-6 days a week. Continue current care and f/u when testing complete      Cody Hines NP       [address] [facility]             Patient seen, examined by me personally. Plan discussed as detailed. Agree with note as outlined by  NP with modifications as noted. My independent physical exam reveals : Physical Exam  Vitals and nursing note reviewed. Cardiovascular:      Rate and Rhythm: Normal rate and regular rhythm. Heart sounds: Normal heart sounds. Pulmonary:      Breath sounds: Normal breath sounds. Musculoskeletal:      Right lower leg: No edema. Left lower leg: No edema. Skin:     General: Skin is warm and dry. Neurological:      Mental Status: She is alert and oriented to person, place, and time. Psychiatric:         Mood and Affect: Mood normal.          No additional findings noted. Agree with plan as outlined above with modifications as noted. She is diabetic and is on 2 diuretics for HTN. Will d/c amiloride and start ARB. She was on valsartan/amlodipine and was advised by her pharmacist to stop ??!!. Will evaluate symptoms as noted. Discussed diet, exercise, weight loss.     Xavier Solis MD

## 2022-02-01 NOTE — PROGRESS NOTES
Patient received a 2 week event monitor. Instructions given verbally as well as an instruction sheet. Pt verbalized understanding.     [facility] Event Monitoring

## 2022-02-16 ENCOUNTER — ANCILLARY PROCEDURE (OUTPATIENT)
Dept: CARDIOLOGY CLINIC | Age: 42
End: 2022-02-16
Payer: COMMERCIAL

## 2022-02-16 VITALS
BODY MASS INDEX: 37.22 KG/M2 | SYSTOLIC BLOOD PRESSURE: 134 MMHG | WEIGHT: 218 LBS | DIASTOLIC BLOOD PRESSURE: 82 MMHG | HEIGHT: 64 IN

## 2022-02-16 DIAGNOSIS — E78.2 MIXED HYPERLIPIDEMIA: ICD-10-CM

## 2022-02-16 DIAGNOSIS — R00.2 INTERMITTENT PALPITATIONS: ICD-10-CM

## 2022-02-16 DIAGNOSIS — I10 ESSENTIAL HYPERTENSION: ICD-10-CM

## 2022-02-16 DIAGNOSIS — Z72.0 TOBACCO ABUSE: ICD-10-CM

## 2022-02-16 DIAGNOSIS — I10 BENIGN ESSENTIAL HTN: ICD-10-CM

## 2022-02-16 DIAGNOSIS — E11.8 CONTROLLED DIABETES MELLITUS TYPE 2 WITH COMPLICATIONS, UNSPECIFIED WHETHER LONG TERM INSULIN USE (HCC): ICD-10-CM

## 2022-02-16 PROCEDURE — 93306 TTE W/DOPPLER COMPLETE: CPT | Performed by: INTERNAL MEDICINE

## 2022-02-17 ENCOUNTER — TELEPHONE (OUTPATIENT)
Dept: CARDIOLOGY CLINIC | Age: 42
End: 2022-02-17

## 2022-02-17 LAB
ECHO AO ASC DIAM: 3.2 CM
ECHO AO ASCENDING AORTA INDEX: 1.58 CM/M2
ECHO AO ROOT DIAM: 3 CM
ECHO AO ROOT INDEX: 1.48 CM/M2
ECHO AV PEAK GRADIENT: 9 MMHG
ECHO AV PEAK VELOCITY: 1.5 M/S
ECHO AV VELOCITY RATIO: 0.73
ECHO LA DIAMETER INDEX: 2.22 CM/M2
ECHO LA DIAMETER: 4.5 CM
ECHO LA TO AORTIC ROOT RATIO: 1.5
ECHO LA VOL 2C: 27 ML (ref 22–52)
ECHO LA VOL 4C: 40 ML (ref 22–52)
ECHO LA VOL BP: 34 ML (ref 22–52)
ECHO LA VOL/BSA BIPLANE: 17 ML/M2 (ref 16–34)
ECHO LA VOLUME AREA LENGTH: 35 ML
ECHO LA VOLUME INDEX A2C: 13 ML/M2 (ref 16–34)
ECHO LA VOLUME INDEX A4C: 20 ML/M2 (ref 16–34)
ECHO LA VOLUME INDEX AREA LENGTH: 17 ML/M2 (ref 16–34)
ECHO LV E' LATERAL VELOCITY: 12 CM/S
ECHO LV E' SEPTAL VELOCITY: 11 CM/S
ECHO LV FRACTIONAL SHORTENING: 41 % (ref 28–44)
ECHO LV INTERNAL DIMENSION DIASTOLE INDEX: 2.41 CM/M2
ECHO LV INTERNAL DIMENSION DIASTOLIC: 4.9 CM (ref 3.9–5.3)
ECHO LV INTERNAL DIMENSION SYSTOLIC INDEX: 1.43 CM/M2
ECHO LV INTERNAL DIMENSION SYSTOLIC: 2.9 CM
ECHO LV ISOVOLUMETRIC RELAXATION TIME (IVRT): 81.8 MS
ECHO LV IVSD: 1.2 CM (ref 0.6–0.9)
ECHO LV MASS 2D: 226.4 G (ref 67–162)
ECHO LV MASS INDEX 2D: 111.5 G/M2 (ref 43–95)
ECHO LV POSTERIOR WALL DIASTOLIC: 1.2 CM (ref 0.6–0.9)
ECHO LV RELATIVE WALL THICKNESS RATIO: 0.49
ECHO LVOT PEAK GRADIENT: 5 MMHG
ECHO LVOT PEAK VELOCITY: 1.1 M/S
ECHO MV "A" WAVE DURATION: 137 MSEC
ECHO MV A VELOCITY: 0.59 M/S
ECHO MV E DECELERATION TIME (DT): 168 MS
ECHO MV E VELOCITY: 0.78 M/S
ECHO MV E/A RATIO: 1.32
ECHO MV E/E' LATERAL: 6.5
ECHO MV E/E' RATIO (AVERAGED): 6.8
ECHO MV E/E' SEPTAL: 7.09
ECHO RV FREE WALL PEAK S': 13 CM/S
ECHO RV TAPSE: 2.2 CM (ref 1.5–2)

## 2022-02-17 NOTE — TELEPHONE ENCOUNTER
----- Message from Bharat Silverman NP sent at 2/17/2022 10:19 AM EST -----  Please call the patient and inform that cardiac monitor is normal.    Thanks,  [employer]

## 2022-02-17 NOTE — TELEPHONE ENCOUNTER
Avtar Hernandez, MIKE Anne  Please call the patient and inform that echocardiogram is normal, ejection fraction is 55-60%. No concern for heart failure at this time. Pattie Sanches working appropriately.

## 2022-02-17 NOTE — PROGRESS NOTES
Please call the patient and inform that echocardiogram is normal, ejection fraction is 55-60%. No concern for heart failure at this time. Valves working appropriately.     Thanks,  Floridalma Keep

## 2022-03-01 ENCOUNTER — TELEPHONE (OUTPATIENT)
Dept: CARDIOLOGY CLINIC | Age: 42
End: 2022-03-01

## 2022-03-01 NOTE — TELEPHONE ENCOUNTER
----- Message from Sasha Vazquez NP sent at 2/17/2022  4:51 PM EST -----  Please call the patient and inform that echocardiogram is normal, ejection fraction is 55-60%. No concern for heart failure at this time. Valves working appropriately.     Thanks,  Rocael Mendoza

## 2022-03-01 NOTE — TELEPHONE ENCOUNTER
Left alex on voice mail to call office back at [phone]  This writer tried to call patient since Feb 17, 2022 with results for Holter Monitor and Echo unsuccessfully. Postcard sent to address on file.

## 2022-03-02 ENCOUNTER — TELEPHONE (OUTPATIENT)
Dept: CARDIOLOGY CLINIC | Age: 42
End: 2022-03-02

## 2022-03-02 NOTE — TELEPHONE ENCOUNTER
Verified Patient with two identifiers  This writer read results from Even Monitor and Ecocardiogram to patient. No questions voiced.

## 2022-03-02 NOTE — TELEPHONE ENCOUNTER
----- Message from Jeanie Barrow NP sent at 2/17/2022  4:51 PM EST -----  Please call the patient and inform that echocardiogram is normal, ejection fraction is 55-60%. No concern for heart failure at this time.   Valves working appropriately.     Thanks,  Trae Gusman

## 2022-09-12 ENCOUNTER — TRANSCRIBE ORDER (OUTPATIENT)
Dept: SCHEDULING | Age: 42
End: 2022-09-12

## 2022-09-12 DIAGNOSIS — Z12.31 SCREENING MAMMOGRAM FOR HIGH-RISK PATIENT: Primary | ICD-10-CM

## 2022-10-18 ENCOUNTER — OFFICE VISIT (OUTPATIENT)
Dept: INTERNAL MEDICINE CLINIC | Age: 42
End: 2022-10-18
Payer: COMMERCIAL

## 2022-10-18 VITALS
RESPIRATION RATE: 20 BRPM | OXYGEN SATURATION: 98 % | HEIGHT: 64 IN | WEIGHT: 227.1 LBS | DIASTOLIC BLOOD PRESSURE: 90 MMHG | TEMPERATURE: 98.8 F | BODY MASS INDEX: 38.77 KG/M2 | HEART RATE: 85 BPM | SYSTOLIC BLOOD PRESSURE: 153 MMHG

## 2022-10-18 DIAGNOSIS — E11.9 CONTROLLED TYPE 2 DIABETES MELLITUS WITHOUT COMPLICATION, WITHOUT LONG-TERM CURRENT USE OF INSULIN (HCC): ICD-10-CM

## 2022-10-18 DIAGNOSIS — Z00.00 WELL WOMAN EXAM (NO GYNECOLOGICAL EXAM): Primary | ICD-10-CM

## 2022-10-18 DIAGNOSIS — Z11.59 NEED FOR HEPATITIS C SCREENING TEST: ICD-10-CM

## 2022-10-18 DIAGNOSIS — I10 PRIMARY HYPERTENSION: ICD-10-CM

## 2022-10-18 PROCEDURE — 99386 PREV VISIT NEW AGE 40-64: CPT | Performed by: FAMILY MEDICINE

## 2022-10-18 PROCEDURE — 99204 OFFICE O/P NEW MOD 45 MIN: CPT | Performed by: FAMILY MEDICINE

## 2022-10-18 RX ORDER — LOSARTAN POTASSIUM 25 MG/1
25 TABLET ORAL DAILY
Qty: 90 TABLET | Refills: 1 | Status: SHIPPED | OUTPATIENT
Start: 2022-10-18

## 2022-10-18 NOTE — PROGRESS NOTES
Chief Complaint   Patient presents with    Establish Care    Hypertension     Patient states she has had a few nosebleeds       1. Have you been to the ER, urgent care clinic since your last visit? Hospitalized since your last visit? No    2. Have you seen or consulted any other health care providers outside of the [address] [place] [address] since your last visit? Include any pap smears or colon screening.  No

## 2022-10-18 NOTE — PATIENT INSTRUCTIONS
DASH Diet: Care Instructions  Your Care Instructions     The DASH diet is an eating plan that can help lower your blood pressure. DASH stands for Dietary Approaches to Stop Hypertension. Hypertension is high blood pressure. The DASH diet focuses on eating foods that are high in calcium, potassium, and magnesium. These nutrients can lower blood pressure. The foods that are highest in these nutrients are fruits, vegetables, low-fat dairy products, nuts, seeds, and legumes. But taking calcium, potassium, and magnesium supplements instead of eating foods that are high in those nutrients does not have the same effect. The DASH diet also includes whole grains, fish, and poultry. The DASH diet is one of several lifestyle changes your doctor may recommend to lower your high blood pressure. Your doctor may also want you to decrease the amount of sodium in your diet. Lowering sodium while following the DASH diet can lower blood pressure even further than just the DASH diet alone. Follow-up care is a key part of your treatment and safety. Be sure to make and go to all appointments, and call your doctor if you are having problems. It's also a good idea to know your test results and keep a list of the medicines you take. How can you care for yourself at home? Following the DASH diet  Eat 4 to 5 servings of fruit each day. A serving is 1 medium-sized piece of fruit, ½ cup chopped or canned fruit, 1/4 cup dried fruit, or 4 ounces (½ cup) of fruit juice. Choose fruit more often than fruit juice. Eat 4 to 5 servings of vegetables each day. A serving is 1 cup of lettuce or raw leafy vegetables, ½ cup of chopped or cooked vegetables, or 4 ounces (½ cup) of vegetable juice. Choose vegetables more often than vegetable juice. Get 2 to 3 servings of low-fat and fat-free dairy each day. A serving is 8 ounces of milk, 1 cup of yogurt, or 1 ½ ounces of cheese. Eat 6 to 8 servings of grains each day.  A serving is 1 slice of bread, 1 ounce of dry cereal, or ½ cup of cooked rice, pasta, or cooked cereal. Try to choose whole-grain products as much as possible. Limit lean meat, poultry, and fish to 2 servings each day. A serving is 3 ounces, about the size of a deck of cards. Eat 4 to 5 servings of nuts, seeds, and legumes (cooked dried beans, lentils, and split peas) each week. A serving is 1/3 cup of nuts, 2 tablespoons of seeds, or ½ cup of cooked beans or peas. Limit fats and oils to 2 to 3 servings each day. A serving is 1 teaspoon of vegetable oil or 2 tablespoons of salad dressing. Limit sweets and added sugars to 5 servings or less a week. A serving is 1 tablespoon jelly or jam, ½ cup sorbet, or 1 cup of lemonade. Eat less than 2,300 milligrams (mg) of sodium a day. If you limit your sodium to 1,500 mg a day, you can lower your blood pressure even more. Be aware that all of these are the suggested number of servings for people who eat 1,800 to 2,000 calories a day. Your recommended number of servings may be different if you need more or fewer calories. Tips for success  Start small. Do not try to make dramatic changes to your diet all at once. You might feel that you are missing out on your favorite foods and then be more likely to not follow the plan. Make small changes, and stick with them. Once those changes become habit, add a few more changes. Try some of the following:  Make it a goal to eat a fruit or vegetable at every meal and at snacks. This will make it easy to get the recommended amount of fruits and vegetables each day. Try yogurt topped with fruit and nuts for a snack or healthy dessert. Add lettuce, tomato, cucumber, and onion to sandwiches. Combine a ready-made pizza crust with low-fat mozzarella cheese and lots of vegetable toppings. Try using tomatoes, squash, spinach, broccoli, carrots, cauliflower, and onions.   Have a variety of cut-up vegetables with a low-fat dip as an appetizer instead of chips and dip.  Sprinkle sunflower seeds or chopped almonds over salads. Or try adding chopped walnuts or almonds to cooked vegetables. Try some vegetarian meals using beans and peas. Add garbanzo or kidney beans to salads. Make burritos and tacos with mashed paniagua beans or black beans. Where can you learn more? Go to http://www.mckinney.com/  Enter H967 in the search box to learn more about \"DASH Diet: Care Instructions. \"  Current as of: January 10, 2022               Content Version: 13.2  © 0453-4686 [employer]. Care instructions adapted under license by [employer] (which disclaims liability or warranty for this information). If you have questions about a medical condition or this instruction, always ask your healthcare professional. [address] any warranty or liability for your use of this information.

## 2022-10-18 NOTE — PROGRESS NOTES
Chief Complaint   Patient presents with    [facility]    Hypertension     Patient states she has had a few nosebleeds       she is a 43y.o. year old female who presents for CPE  Complete Physical Exam Questions:        1. Do you follow a low fat diet?  no  2. Are you up to date on your Tdap (<10 years)? Yes  3. Have you ever had a Pneumovax vaccine (>65)? No   PCV13 No   PPSV23 No  4. Have you had Zoster vaccine (>60)? No  5. Have you had the HPV - Gardasil (13- 26)? No  6. Do you follow an exercise program?  no  7. Do you smoke?  yes  8. Do you consider yourself overweight?  yes  9. Is there a family history of CAD< age 48? No  10. Is there a family history of Cancer?  no  11. Do you know your Cancer risks? Yes  12. Have you had a colonoscopy? yes  13. Have you been tested for HIV or other STI's? No HIV testing today(18-66 y/o)? No  14. Have had a bone density scan or DEXA done(>65)? No  15. Have you had an EKG performed in the last five years (>50)? No    Other complaints:    Reviewed and agree with Nurse Note and duplicated in this note. Reviewed PmHx, RxHx, FmHx, SocHx, AllgHx and updated and dated in the chart.     Family History   Problem Relation Age of Onset    Diabetes Mother 39        on insulin now    Hypertension Father     High Cholesterol Father     Diabetes Sister 32        type 2    Diabetes Maternal Grandmother     Other Maternal Grandfather         MVA    Diabetes Paternal Grandmother     Cancer Paternal Grandfather         unknown       Past Medical History:   Diagnosis Date    Advanced care planning/counseling discussion 6/1/16    Diabetes ([address] 6/2015    A1C 13    Gestational diabetes     HTN (hypertension)     Hyperlipidemia 1/2015    Migraines     Murmur       Social History     Socioeconomic History    Marital status: SINGLE   Tobacco Use    Smoking status: Every Day     Packs/day: 0.25     Types: Cigarettes    Smokeless tobacco: Never    Tobacco comments:     reports she is cutting back   Substance and Sexual Activity    Alcohol use: Yes     Comment: every other weekend    Drug use: Yes     Types: Marijuana    Sexual activity: Never        Review of Systems - negative except as listed above      Objective:     Vitals:    10/18/22 0924 10/18/22 0933   BP: (!) 165/116 (!) 153/90   Pulse: 85    Resp: 20    Temp: 98.8 °F (37.1 °C)    TempSrc: Oral    SpO2: 98%    Weight: 227 lb 1.6 oz (103 kg)    Height: 5' 4\" (1.626 m)        Physical Examination: General appearance - alert, well appearing, and in no distress  Eyes - pupils equal and reactive, extraocular eye movements intact  Ears - bilateral TM's and external ear canals normal  Nose - normal and patent, no erythema, discharge or polyps  Mouth - mucous membranes moist, pharynx normal without lesions  Neck - supple, no significant adenopathy  Chest - clear to auscultation, no wheezes, rales or rhonchi, symmetric air entry  Heart - normal rate, regular rhythm, normal S1, S2, no murmurs, rubs, clicks or gallops  Abdomen - soft, nontender, nondistended, no masses or organomegaly  Neurological - alert, oriented, normal speech, no focal findings or movement disorder noted  Musculoskeletal - no joint tenderness, deformity or swelling  Extremities - peripheral pulses normal, no pedal edema, no clubbing or cyanosis  Skin - normal coloration and turgor, no rashes, no suspicious skin lesions noted      Assessment/ Plan:   Diagnoses and all orders for this visit:    1. Well woman exam (no gynecological exam)  -     CBC W/O DIFF; Future  -     METABOLIC PANEL, COMPREHENSIVE; Future  -     LIPID PANEL; Future    2. Primary hypertension    3. Need for hepatitis C screening test  -     HEPATITIS C AB; Future    4. Controlled type 2 diabetes mellitus without complication, without long-term current use of insulin (HCC)  -     HEMOGLOBIN A1C WITH EAG;  Future  -      DIABETES FOOT EXAM  -     REFERRAL TO OPHTHALMOLOGY  -     MICROALBUMIN, UR, RAND W/ MICROALB/CREAT RATIO; Future         Labs to be drawn: CBC, CMP, Lipid            I have discussed the diagnosis with the patient and the intended plan as seen in the above orders. The patient has received an after-visit summary and questions were answered concerning future plans. Medication Side Effects and Warnings were discussed with patient,  Patient Labs were reviewed and or requested, and  Patient Past Records were reviewed and or requested  yes     Chief Complaint   Patient presents with    Establish Care    Hypertension     Patient states she has had a few nosebleeds       Pt who presents for follow up of a pre-existing problem of diabetes and hypertension. Patient states that she ran out of her medication that was prescribed by cardiology-losartan several months ago  Diet and Lifestyle: not attempting to follow a low fat, low cholesterol diet, exercises sporadically, smoker    Home BP Monitoring: is not measured at home  Use of agents associated with hypertension: none. Cardiovascular ROS: taking medications as instructed, no medication side effects noted, no TIA's, no chest pain on exertion, no dyspnea on exertion, no swelling of ankles. Reviewed and agree with Nurse Note and duplicated in this note. Reviewed PmHx, RxHx, FmHx, SocHx, AllgHx and updated and dated in the chart.     Family History   Problem Relation Age of Onset    Diabetes Mother 39        on insulin now    Hypertension Father     High Cholesterol Father     Diabetes Sister 32        type 2    Diabetes Maternal Grandmother     Other Maternal Grandfather         MVA    Diabetes Paternal Grandmother     Cancer Paternal Grandfather         unknown       Past Medical History:   Diagnosis Date    Advanced care planning/counseling discussion 6/1/16    Diabetes ([facility] [address] 6/2015    A1C 13    Gestational diabetes     HTN (hypertension)     Hyperlipidemia 1/2015    Migraines     Murmur       Social History     Socioeconomic History Marital status: SINGLE   Tobacco Use    Smoking status: Every Day     Packs/day: 0.25     Types: Cigarettes    Smokeless tobacco: Never    Tobacco comments:     reports she is cutting back   Substance and Sexual Activity    Alcohol use: Yes     Comment: every other weekend    Drug use: Yes     Types: Marijuana    Sexual activity: Never        Review of Systems - negative except as listed above      Objective:     Vitals:    10/18/22 0924 10/18/22 0933   BP: (!) 165/116 (!) 153/90   Pulse: 85    Resp: 20    Temp: 98.8 °F (37.1 °C)    TempSrc: Oral    SpO2: 98%    Weight: 227 lb 1.6 oz (103 kg)    Height: 5' 4\" (1.626 m)        Physical Examination: General appearance - alert, well appearing, and in no distress  Eyes - pupils equal and reactive, extraocular eye movements intact  Ears - bilateral TM's and external ear canals normal  Nose - normal and patent, no erythema, discharge or polyps  Mouth - mucous membranes moist, pharynx normal without lesions  Neck - supple, no significant adenopathy  Chest - clear to auscultation, no wheezes, rales or rhonchi, symmetric air entry  Heart - normal rate, regular rhythm, normal S1, S2, no murmurs, rubs, clicks or gallops  Abdomen - soft, nontender, nondistended, no masses or organomegaly  Extremities - peripheral pulses normal, no pedal edema, no clubbing or cyanosis  Skin - normal coloration and turgor, no rashes, no suspicious skin lesions noted     Assessment/ Plan:   Diagnoses and all orders for this visit:    1. Well woman exam (no gynecological exam)  -     CBC W/O DIFF; Future  -     METABOLIC PANEL, COMPREHENSIVE; Future  -     LIPID PANEL; Future    2. Primary hypertension  Will restart patient's blood pressure medications losartan 25 mg and taper up in 2 weeks if needed  DASH diet recommended and exercising 30 minutes a day  Recommend smoking cessation  3. Need for hepatitis C screening test  -     HEPATITIS C AB; Future    4.  Controlled type 2 diabetes mellitus without complication, without long-term current use of insulin (HCC)  -     HEMOGLOBIN A1C WITH EAG; Future  -      DIABETES FOOT EXAM  -     REFERRAL TO OPHTHALMOLOGY  -     MICROALBUMIN, UR, RAND W/ MICROALB/CREAT RATIO; Future    Other orders  -     losartan (COZAAR) 25 mg tablet; Take 1 Tablet by mouth daily. Medication Side Effects and Warnings were discussed with patient,  Patient Labs were reviewed and or requested, and  Patient Past Records were reviewed and or requested  yes       I have discussed the diagnosis with the patient and the intended plan as seen in the above orders. The patient has received an after-visit summary and questions were answered concerning future plans. Pt agrees to call or return to clinic and/or go to closest ER with any worsening of symptoms. This may include, but not limited to increased fever (>100.4) with NSAIDS or Tylenol, increased edema, confusion, rash, worsening of presenting symptoms. Please note that this dictation was completed with [employer], the computer voice recognition software. Quite often unanticipated grammatical, syntax, homophones, and other interpretive errors are inadvertently transcribed by the computer software. Please disregard these errors. Please excuse any errors that have escaped final proofreading. Thank you.

## 2022-10-19 DIAGNOSIS — D75.839 THROMBOCYTOSIS: Primary | ICD-10-CM

## 2022-10-19 DIAGNOSIS — I10 ESSENTIAL HYPERTENSION: ICD-10-CM

## 2022-10-19 LAB
ALBUMIN SERPL-MCNC: 3.8 G/DL (ref 3.5–5)
ALBUMIN/GLOB SERPL: 1 {RATIO} (ref 1.1–2.2)
ALP SERPL-CCNC: 64 U/L (ref 45–117)
ALT SERPL-CCNC: 27 U/L (ref 12–78)
ANION GAP SERPL CALC-SCNC: 5 MMOL/L (ref 5–15)
AST SERPL-CCNC: 11 U/L (ref 15–37)
BILIRUB SERPL-MCNC: 0.4 MG/DL (ref 0.2–1)
BUN SERPL-MCNC: 10 MG/DL (ref 6–20)
BUN/CREAT SERPL: 13 (ref 12–20)
CALCIUM SERPL-MCNC: 9.8 MG/DL (ref 8.5–10.1)
CHLORIDE SERPL-SCNC: 102 MMOL/L (ref 97–108)
CHOLEST SERPL-MCNC: 192 MG/DL
CO2 SERPL-SCNC: 29 MMOL/L (ref 21–32)
CREAT SERPL-MCNC: 0.8 MG/DL (ref 0.55–1.02)
CREAT UR-MCNC: 152 MG/DL
ERYTHROCYTE [DISTWIDTH] IN BLOOD BY AUTOMATED COUNT: 11.9 % (ref 11.5–14.5)
EST. AVERAGE GLUCOSE BLD GHB EST-MCNC: 126 MG/DL
GLOBULIN SER CALC-MCNC: 3.8 G/DL (ref 2–4)
GLUCOSE SERPL-MCNC: 161 MG/DL (ref 65–100)
HBA1C MFR BLD: 6 % (ref 4–5.6)
HCT VFR BLD AUTO: 45.9 % (ref 35–47)
HCV AB SERPL QL IA: NONREACTIVE
HDLC SERPL-MCNC: 48 MG/DL
HDLC SERPL: 4 {RATIO} (ref 0–5)
HGB BLD-MCNC: 14.7 G/DL (ref 11.5–16)
LDLC SERPL CALC-MCNC: 129 MG/DL (ref 0–100)
MCH RBC QN AUTO: 30.8 PG (ref 26–34)
MCHC RBC AUTO-ENTMCNC: 32 G/DL (ref 30–36.5)
MCV RBC AUTO: 96.2 FL (ref 80–99)
MICROALBUMIN UR-MCNC: 1.58 MG/DL
MICROALBUMIN/CREAT UR-RTO: 10 MG/G (ref 0–30)
NRBC # BLD: 0 K/UL (ref 0–0.01)
NRBC BLD-RTO: 0 PER 100 WBC
PLATELET # BLD AUTO: 490 K/UL (ref 150–400)
PMV BLD AUTO: 9 FL (ref 8.9–12.9)
POTASSIUM SERPL-SCNC: 3.5 MMOL/L (ref 3.5–5.1)
PROT SERPL-MCNC: 7.6 G/DL (ref 6.4–8.2)
RBC # BLD AUTO: 4.77 M/UL (ref 3.8–5.2)
SODIUM SERPL-SCNC: 136 MMOL/L (ref 136–145)
TRIGL SERPL-MCNC: 75 MG/DL (ref ?–150)
VLDLC SERPL CALC-MCNC: 15 MG/DL
WBC # BLD AUTO: 6.5 K/UL (ref 3.6–11)

## 2022-10-19 RX ORDER — INDAPAMIDE 2.5 MG/1
TABLET, FILM COATED ORAL
Qty: 90 TABLET | Refills: 3 | Status: SHIPPED | OUTPATIENT
Start: 2022-10-19

## 2022-10-19 NOTE — PROGRESS NOTES
Diabetes well controlled, continue the good work  Cholesterol slightly elevated he may need treatment with cholesterol medication.   If you are open to this I can send this to your pharmacy, lets repeat in 3 months    Your platelets are slightly elevated and your blood work, please return to lab in 1 month for repeat blood draw

## 2022-11-02 ENCOUNTER — OFFICE VISIT (OUTPATIENT)
Dept: ENDOCRINOLOGY | Age: 42
End: 2022-11-02
Payer: COMMERCIAL

## 2022-11-02 VITALS
HEART RATE: 73 BPM | DIASTOLIC BLOOD PRESSURE: 94 MMHG | HEIGHT: 64 IN | WEIGHT: 229.2 LBS | BODY MASS INDEX: 39.13 KG/M2 | SYSTOLIC BLOOD PRESSURE: 155 MMHG

## 2022-11-02 DIAGNOSIS — E11.9 TYPE 2 DIABETES MELLITUS WITHOUT COMPLICATION, WITHOUT LONG-TERM CURRENT USE OF INSULIN (HCC): Primary | ICD-10-CM

## 2022-11-02 PROCEDURE — 3074F SYST BP LT 130 MM HG: CPT | Performed by: INTERNAL MEDICINE

## 2022-11-02 PROCEDURE — 99214 OFFICE O/P EST MOD 30 MIN: CPT | Performed by: INTERNAL MEDICINE

## 2022-11-02 PROCEDURE — 3044F HG A1C LEVEL LT 7.0%: CPT | Performed by: INTERNAL MEDICINE

## 2022-11-02 PROCEDURE — 3078F DIAST BP <80 MM HG: CPT | Performed by: INTERNAL MEDICINE

## 2022-11-02 NOTE — PROGRESS NOTES
Sherita Tineo is a 43 y.o. female presents for follow-up of diabetes x7 years. She was previously followed by Dr. Ne Austin. She has had diabetes since June 2015 - had gestational DM in 2012. .  A1c was 13.7 in summer 2015, then 6.1 in late 2015 and 6.4% in December 2019. Was prescribed Ozempic, but did not take it because of side effects. Saw her for the first and only time in July 2021. At that time her A1c was 6%. She presents today with an A1c of 6.0%. Current diabetes regimen:  Metformin ER 1000 mg twice daily - tolerating well  Prefers to take less medications. She does not check blood sugars. She admits that she has fallen off the wagon in terms of sweet tea and regular soda. She has 2 or 3 a day of these drinks. She does not eat breakfast now. Lunch is a sandwich or a wrap. Last night for dinner she had fried chicken pancakes and a regular Sprite. She also had a candy bar last night which she started back as well. Her blood sugar this morning was 138. She had stopped going to the gym for a while but is back into the habit. She tells me she works out for 30 to 45 minutes almost every evening before dinner. .  She denies chest pain, shortness of breath, constipation or diarrhea    Examination  Blood pressure 155/94  Pulse 80 and regular  Weight 229  BMI 39.3  HEENT unremarkable  Lungs clear  Heart reveals a regular rate and rhythm  Abdomen obese  Extremities unremarkable  Diabetic foot exam:     Left Foot:   Visual Exam: normal    Pulse DP: 2+ (normal)   Filament test: normal sensation    Vibratory sensation: normal      Right Foot:   Visual Exam: normal    Pulse DP: 2+ (normal)   Filament test: normal sensation    Vibratory sensation: normal   Impression  1. Type 2 diabetes mellitus with excellent glucose control on metformin as her only medication    Plan:  1. She is very happy with where she is and just wanted a touch base.   I did review all of her laboratory data recently obtained and advised her that she was doing very well  2.   She would like to see me back in 1 year

## 2022-12-02 NOTE — TELEPHONE ENCOUNTER
Requested Prescriptions     Pending Prescriptions Disp Refills    metFORMIN ER (GLUCOPHAGE XR) 500 mg tablet 360 Tablet 3

## 2022-12-02 NOTE — TELEPHONE ENCOUNTER
12/2/2022  4:29 PM      Pt called and left a voice mail at 2:51 pm.Pt stated she needs a refill on her metformin er 500 mg 24 hr tab. #[phone]    Thanks,  Annelise Amador

## 2022-12-03 RX ORDER — METFORMIN HYDROCHLORIDE 500 MG/1
TABLET, EXTENDED RELEASE ORAL
Qty: 360 TABLET | Refills: 3 | Status: SHIPPED | OUTPATIENT
Start: 2022-12-03

## 2023-01-06 ENCOUNTER — NURSE TRIAGE (OUTPATIENT)
Dept: OTHER | Facility: CLINIC | Age: 43
End: 2023-01-06

## 2023-01-09 ENCOUNTER — OFFICE VISIT (OUTPATIENT)
Dept: INTERNAL MEDICINE CLINIC | Age: 43
End: 2023-01-09
Payer: COMMERCIAL

## 2023-01-09 VITALS
BODY MASS INDEX: 38.93 KG/M2 | WEIGHT: 228 LBS | HEART RATE: 77 BPM | HEIGHT: 64 IN | TEMPERATURE: 98.3 F | OXYGEN SATURATION: 99 % | SYSTOLIC BLOOD PRESSURE: 153 MMHG | DIASTOLIC BLOOD PRESSURE: 87 MMHG | RESPIRATION RATE: 16 BRPM

## 2023-01-09 DIAGNOSIS — M71.21 BAKER'S CYST, RIGHT: Primary | ICD-10-CM

## 2023-01-09 PROCEDURE — 3079F DIAST BP 80-89 MM HG: CPT | Performed by: FAMILY MEDICINE

## 2023-01-09 PROCEDURE — 73564 X-RAY EXAM KNEE 4 OR MORE: CPT | Performed by: FAMILY MEDICINE

## 2023-01-09 PROCEDURE — 3077F SYST BP >= 140 MM HG: CPT | Performed by: FAMILY MEDICINE

## 2023-01-09 PROCEDURE — 99214 OFFICE O/P EST MOD 30 MIN: CPT | Performed by: FAMILY MEDICINE

## 2023-01-09 RX ORDER — NAPROXEN 500 MG/1
500 TABLET ORAL 2 TIMES DAILY WITH MEALS
Qty: 30 TABLET | Refills: 1 | Status: SHIPPED | OUTPATIENT
Start: 2023-01-09

## 2023-01-09 NOTE — PROGRESS NOTES
Chief Complaint   Patient presents with    Knee Swelling     Patient is here for right knee swelling      she is a 43y.o. year old female who presents for evaluation of right knee   Pain Assessment Encounter      Keiry Kemp  1/9/2023  Onset of Symptoms: Patient states she has had issues for 6 months   ________________________________________________________________________  Description:Patient states she has a knot on the back of her right knee. Patient states there is swelling. Patient also states she is unaware of any injures      Frequency: 5 times a day  Pain Scale:(1-10): 3  Trauma Hx: none  Hx of similar symptoms: No:   Radiation: NO, knee  Duration:  continuous      Progression: has worsened  What makes it better?: OTC meds  What makes it worse?:exercise  Medications tried: acetaminophen    Reviewed and agree with Nurse Note and duplicated in this note. Reviewed PmHx, RxHx, FmHx, SocHx, AllgHx and updated and dated in the chart.     Family History   Problem Relation Age of Onset    Diabetes Mother 39        on insulin now    Hypertension Father     High Cholesterol Father     Diabetes Sister 32        type 2    Diabetes Maternal Grandmother     Other Maternal Grandfather         MVA    Diabetes Paternal Grandmother     Cancer Paternal Grandfather         unknown       Past Medical History:   Diagnosis Date    Advanced care planning/counseling discussion 6/1/16    Diabetes ([address] 6/2015    A1C 13    Gestational diabetes     HTN (hypertension)     Hyperlipidemia 1/2015    Migraines     Murmur       Social History     Socioeconomic History    Marital status: SINGLE   Tobacco Use    Smoking status: Every Day     Packs/day: 0.25     Types: Cigarettes    Smokeless tobacco: Never    Tobacco comments:     reports she is cutting back   Substance and Sexual Activity    Alcohol use: Yes     Comment: every other weekend    Drug use: Yes     Types: Marijuana    Sexual activity: Never        Review of Systems - negative except as listed above      Objective:     Vitals:    01/09/23 1045   BP: (!) 153/87   Pulse: 77   Resp: 16   Temp: 98.3 °F (36.8 °C)   SpO2: 99%   Weight: 228 lb (103.4 kg)   Height: 5' 4\" (1.626 m)       Physical Examination: General appearance - alert, well appearing, and in no distress  Back exam - full range of motion, no tenderness, palpable spasm or pain on motion  Neurological - alert, oriented, normal speech, no focal findings or movement disorder noted  Musculoskeletal - right knee exam:  The patient'sright Knee  is  normal to inspection. The ROM is normal and there is flexion to Normal Effusion is: mild The joint exhibits Negative warmth and Crepitus is: absent. The Radha test is:  negative Joint Line Tenderness is   medial.  The Thessaly Test is not tested  and the Lachman is  negative. The Anterior Drawer is negative. The Posterior Drawer is:  negative. Valgus Stress (for MCL) is:  normal . Varus Stress (for LCL) is  normal  . The Amy Test is negative and the Apprehension Sign:  negative  Patellar Grind negative   Extremities - peripheral pulses normal, no pedal edema, no clubbing or cyanosis  Skin - normal coloration and turgor, no rashes, no suspicious skin lesions noted   Indications for study:  right knee swelling  Limited musculoskeletal ultrasound examination was performed on the posterior right knee with the following findings: Medial gastrocnemius / semimembranosis intertendinous interface (crossover) - long: + Baker cyst   Medial gastrocnemius / semimembranosis intertendinous interface (crossover) - trans: + Baker cyst     Impression:   moderate Bakers cyst    Scanned and Interpreted by:  Josefina Conner [facility]   Assessment/ Plan:   Diagnoses and all orders for this visit:    1.  Baker's cyst, right  -     AMB POC US,EXTREMITY,NONVASCULAR,REAL-TIME IMAGE,LIMITED  -     XR KNEE RT MIN 4 V; Future       Pathophysiology, recovery and rehabilitation process discussed and questions answered   Counseling for 30 Minutes of the total visit duration   Pictures and figures used as necessary   Provided reassurance   Monitor response to Physical Therapy   Recommend  lower impact activities-walking, Eliptical, Nordic Track, cycling or swimming              1) Remember to stay active and/or exercise regularly (I suggest 30-45 minutes daily)   2) For reliable dietary information, go to www. EATRIGHT.org. You may wish to consider seeing the nutritionist at [facility] [phone], also consider the [address] I have discussed the diagnosis with the patient and the intended plan as seen in the above orders. The patient has received an after-visit summary and questions were answered concerning future plans. Medication Side Effects and Warnings were discussed with patient,  Patient Labs were reviewed and or requested, and  Patient Past Records were reviewed and or requested  yes      Pt agrees to call or return to clinic and/or go to closest ER with any worsening of symptoms. This may include, but not limited to increased fever (>100.4) with NSAIDS or Tylenol, increased edema, confusion, rash, worsening of presenting symptoms. Please note that this dictation was completed with [employer], the computer voice recognition software. Quite often unanticipated grammatical, syntax, homophones, and other interpretive errors are inadvertently transcribed by the computer software. Please disregard these errors. Please excuse any errors that have escaped final proofreading. Thank you.

## 2023-02-02 ENCOUNTER — HOSPITAL ENCOUNTER (OUTPATIENT)
Dept: MAMMOGRAPHY | Age: 43
Discharge: HOME | End: 2023-02-02
Attending: OBSTETRICS & GYNECOLOGY
Payer: COMMERCIAL

## 2023-02-02 DIAGNOSIS — Z12.31 SCREENING MAMMOGRAM FOR HIGH-RISK PATIENT: ICD-10-CM

## 2023-02-02 PROCEDURE — 77067 SCR MAMMO BI INCL CAD: CPT

## 2023-05-28 NOTE — TELEPHONE ENCOUNTER
----- Message from Jeannine Lee sent at 5/6/2020  3:29 PM EDT ----- Regarding: /Refill Contact: [phone] Pt requesting a refill for the medication amlodipine -valsartan 5-30 mg. Pt is out of the medication. Pt uses [facility]. Pt best contact number is [phone]
Requested Prescriptions Pending Prescriptions Disp Refills  amLODIPine-valsartan (EXFORGE) 5-320 mg per tablet 90 Tab 3 Sig: Take 1 Tab by mouth daily.
Vaccine status unknown

## 2024-03-29 ENCOUNTER — TRANSCRIBE ORDERS (OUTPATIENT)
Facility: HOSPITAL | Age: 44
End: 2024-03-29

## 2024-03-29 DIAGNOSIS — Z12.31 SCREENING MAMMOGRAM FOR HIGH-RISK PATIENT: Primary | ICD-10-CM

## 2024-04-22 ENCOUNTER — HOSPITAL ENCOUNTER (OUTPATIENT)
Facility: HOSPITAL | Age: 44
Discharge: HOME | End: 2024-04-25
Attending: OBSTETRICS & GYNECOLOGY
Payer: COMMERCIAL

## 2024-04-22 VITALS — HEIGHT: 64 IN | BODY MASS INDEX: 38.93 KG/M2 | WEIGHT: 228 LBS

## 2024-04-22 DIAGNOSIS — Z12.31 SCREENING MAMMOGRAM FOR HIGH-RISK PATIENT: ICD-10-CM

## 2024-04-22 PROCEDURE — 77063 BREAST TOMOSYNTHESIS BI: CPT

## 2024-11-05 ENCOUNTER — OFFICE VISIT (OUTPATIENT)
Facility: CLINIC | Age: 44
End: 2024-11-05

## 2024-11-05 VITALS
RESPIRATION RATE: 16 BRPM | DIASTOLIC BLOOD PRESSURE: 78 MMHG | OXYGEN SATURATION: 99 % | WEIGHT: 221.1 LBS | HEIGHT: 64 IN | SYSTOLIC BLOOD PRESSURE: 116 MMHG | TEMPERATURE: 98.2 F | HEART RATE: 72 BPM | BODY MASS INDEX: 37.75 KG/M2

## 2024-11-05 DIAGNOSIS — E11.9 TYPE 2 DIABETES MELLITUS WITHOUT COMPLICATION, WITHOUT LONG-TERM CURRENT USE OF INSULIN (HCC): ICD-10-CM

## 2024-11-05 DIAGNOSIS — E66.01 MORBID (SEVERE) OBESITY DUE TO EXCESS CALORIES: ICD-10-CM

## 2024-11-05 DIAGNOSIS — M17.11 PRIMARY OSTEOARTHRITIS OF RIGHT KNEE: ICD-10-CM

## 2024-11-05 DIAGNOSIS — I10 PRIMARY HYPERTENSION: ICD-10-CM

## 2024-11-05 DIAGNOSIS — Z00.00 WELL WOMAN EXAM (NO GYNECOLOGICAL EXAM): Primary | ICD-10-CM

## 2024-11-05 LAB
ALBUMIN SERPL-MCNC: 4 G/DL (ref 3.5–5)
ALBUMIN/GLOB SERPL: 1.2 (ref 1.1–2.2)
ALP SERPL-CCNC: 70 U/L (ref 45–117)
ALT SERPL-CCNC: 27 U/L (ref 12–78)
ANION GAP SERPL CALC-SCNC: 3 MMOL/L (ref 2–12)
AST SERPL-CCNC: 12 U/L (ref 15–37)
BASOPHILS # BLD: 0.1 K/UL (ref 0–0.1)
BASOPHILS NFR BLD: 1 % (ref 0–1)
BILIRUB SERPL-MCNC: 0.6 MG/DL (ref 0.2–1)
BUN SERPL-MCNC: 9 MG/DL (ref 6–20)
BUN/CREAT SERPL: 13 (ref 12–20)
CALCIUM SERPL-MCNC: 9.7 MG/DL (ref 8.5–10.1)
CHLORIDE SERPL-SCNC: 105 MMOL/L (ref 97–108)
CHOLEST SERPL-MCNC: 106 MG/DL
CO2 SERPL-SCNC: 29 MMOL/L (ref 21–32)
CREAT SERPL-MCNC: 0.71 MG/DL (ref 0.55–1.02)
CREAT UR-MCNC: 98.4 MG/DL
DIFFERENTIAL METHOD BLD: ABNORMAL
EOSINOPHIL # BLD: 0.2 K/UL (ref 0–0.4)
EOSINOPHIL NFR BLD: 2 % (ref 0–7)
ERYTHROCYTE [DISTWIDTH] IN BLOOD BY AUTOMATED COUNT: 11.8 % (ref 11.5–14.5)
EST. AVERAGE GLUCOSE BLD GHB EST-MCNC: 103 MG/DL
GLOBULIN SER CALC-MCNC: 3.4 G/DL (ref 2–4)
GLUCOSE SERPL-MCNC: 138 MG/DL (ref 65–100)
HBA1C MFR BLD: 5.2 % (ref 4–5.6)
HCT VFR BLD AUTO: 42.7 % (ref 35–47)
HDLC SERPL-MCNC: 48 MG/DL
HDLC SERPL: 2.2 (ref 0–5)
HGB BLD-MCNC: 13.9 G/DL (ref 11.5–16)
IMM GRANULOCYTES # BLD AUTO: 0 K/UL (ref 0–0.04)
IMM GRANULOCYTES NFR BLD AUTO: 0 % (ref 0–0.5)
LDLC SERPL CALC-MCNC: 44.4 MG/DL (ref 0–100)
LYMPHOCYTES # BLD: 3.1 K/UL (ref 0.8–3.5)
LYMPHOCYTES NFR BLD: 42 % (ref 12–49)
MCH RBC QN AUTO: 31 PG (ref 26–34)
MCHC RBC AUTO-ENTMCNC: 32.6 G/DL (ref 30–36.5)
MCV RBC AUTO: 95.3 FL (ref 80–99)
MICROALBUMIN UR-MCNC: 0.76 MG/DL
MICROALBUMIN/CREAT UR-RTO: 8 MG/G (ref 0–30)
MONOCYTES # BLD: 0.7 K/UL (ref 0–1)
MONOCYTES NFR BLD: 9 % (ref 5–13)
NEUTS SEG # BLD: 3.3 K/UL (ref 1.8–8)
NEUTS SEG NFR BLD: 46 % (ref 32–75)
NRBC # BLD: 0 K/UL (ref 0–0.01)
NRBC BLD-RTO: 0 PER 100 WBC
PLATELET # BLD AUTO: 495 K/UL (ref 150–400)
PMV BLD AUTO: 9 FL (ref 8.9–12.9)
POTASSIUM SERPL-SCNC: 4.1 MMOL/L (ref 3.5–5.1)
PROT SERPL-MCNC: 7.4 G/DL (ref 6.4–8.2)
RBC # BLD AUTO: 4.48 M/UL (ref 3.8–5.2)
SODIUM SERPL-SCNC: 137 MMOL/L (ref 136–145)
TRIGL SERPL-MCNC: 68 MG/DL
VLDLC SERPL CALC-MCNC: 13.6 MG/DL
WBC # BLD AUTO: 7.3 K/UL (ref 3.6–11)

## 2024-11-05 RX ORDER — TRIAMCINOLONE ACETONIDE 40 MG/ML
40 INJECTION, SUSPENSION INTRA-ARTICULAR; INTRAMUSCULAR ONCE
Status: COMPLETED | OUTPATIENT
Start: 2024-11-05 | End: 2024-11-05

## 2024-11-05 RX ORDER — ATORVASTATIN CALCIUM 10 MG/1
10 TABLET, FILM COATED ORAL DAILY
COMMUNITY

## 2024-11-05 RX ORDER — AMLODIPINE BESYLATE 5 MG/1
5 TABLET ORAL DAILY
COMMUNITY
Start: 2024-08-31

## 2024-11-05 RX ORDER — VALSARTAN AND HYDROCHLOROTHIAZIDE 160; 12.5 MG/1; MG/1
1 TABLET, FILM COATED ORAL DAILY
COMMUNITY

## 2024-11-05 RX ADMIN — TRIAMCINOLONE ACETONIDE 40 MG: 40 INJECTION, SUSPENSION INTRA-ARTICULAR; INTRAMUSCULAR at 10:30

## 2024-11-05 SDOH — ECONOMIC STABILITY: INCOME INSECURITY: HOW HARD IS IT FOR YOU TO PAY FOR THE VERY BASICS LIKE FOOD, HOUSING, MEDICAL CARE, AND HEATING?: NOT HARD AT ALL

## 2024-11-05 SDOH — ECONOMIC STABILITY: INCOME INSECURITY: HOW HARD IS IT FOR YOU TO PAY FOR THE VERY BASICS LIKE FOOD, HOUSING, MEDICAL CARE, AND HEATING?: PATIENT DECLINED

## 2024-11-05 SDOH — ECONOMIC STABILITY: FOOD INSECURITY: WITHIN THE PAST 12 MONTHS, THE FOOD YOU BOUGHT JUST DIDN'T LAST AND YOU DIDN'T HAVE MONEY TO GET MORE.: NEVER TRUE

## 2024-11-05 SDOH — ECONOMIC STABILITY: FOOD INSECURITY: WITHIN THE PAST 12 MONTHS, THE FOOD YOU BOUGHT JUST DIDN'T LAST AND YOU DIDN'T HAVE MONEY TO GET MORE.: PATIENT DECLINED

## 2024-11-05 SDOH — ECONOMIC STABILITY: TRANSPORTATION INSECURITY
IN THE PAST 12 MONTHS, HAS LACK OF TRANSPORTATION KEPT YOU FROM MEETINGS, WORK, OR FROM GETTING THINGS NEEDED FOR DAILY LIVING?: PATIENT DECLINED

## 2024-11-05 SDOH — ECONOMIC STABILITY: FOOD INSECURITY: WITHIN THE PAST 12 MONTHS, YOU WORRIED THAT YOUR FOOD WOULD RUN OUT BEFORE YOU GOT MONEY TO BUY MORE.: NEVER TRUE

## 2024-11-05 SDOH — ECONOMIC STABILITY: FOOD INSECURITY: WITHIN THE PAST 12 MONTHS, YOU WORRIED THAT YOUR FOOD WOULD RUN OUT BEFORE YOU GOT MONEY TO BUY MORE.: PATIENT DECLINED

## 2024-11-05 ASSESSMENT — PATIENT HEALTH QUESTIONNAIRE - PHQ9
2. FEELING DOWN, DEPRESSED OR HOPELESS: NOT AT ALL
SUM OF ALL RESPONSES TO PHQ QUESTIONS 1-9: 0
1. LITTLE INTEREST OR PLEASURE IN DOING THINGS: NOT AT ALL
SUM OF ALL RESPONSES TO PHQ QUESTIONS 1-9: 0
SUM OF ALL RESPONSES TO PHQ9 QUESTIONS 1 & 2: 0

## 2024-11-05 ASSESSMENT — ANXIETY QUESTIONNAIRES
3. WORRYING TOO MUCH ABOUT DIFFERENT THINGS: NOT AT ALL
5. BEING SO RESTLESS THAT IT IS HARD TO SIT STILL: NOT AT ALL
6. BECOMING EASILY ANNOYED OR IRRITABLE: NOT AT ALL
4. TROUBLE RELAXING: NOT AT ALL
IF YOU CHECKED OFF ANY PROBLEMS ON THIS QUESTIONNAIRE, HOW DIFFICULT HAVE THESE PROBLEMS MADE IT FOR YOU TO DO YOUR WORK, TAKE CARE OF THINGS AT HOME, OR GET ALONG WITH OTHER PEOPLE: NOT DIFFICULT AT ALL
1. FEELING NERVOUS, ANXIOUS, OR ON EDGE: NOT AT ALL
2. NOT BEING ABLE TO STOP OR CONTROL WORRYING: NOT AT ALL
7. FEELING AFRAID AS IF SOMETHING AWFUL MIGHT HAPPEN: NOT AT ALL
GAD7 TOTAL SCORE: 0

## 2024-11-05 NOTE — PROGRESS NOTES
Chief Complaint   Patient presents with    Annual Exam     she is a 44 y.o. year old female who presents for CPE  Complete Physical Exam Questions:    LMP -  10/24/24  Last Pap (q 3 years, or q5 with HPV) - yes  Last Mammogram (50-74 biennially)- yes  Hx of abnl Pap - No    1.  Do you follow a low fat diet?  no  2.  Are you up to date on your Tdap (<10 years)?  No  3.  Have you ever had a Pneumovax vaccine (>65)?  No   PCV13 No   PPSV23 No  4.  Have you had Zoster vaccine (>60)? No  5.  Have you had the HPV - Gardasil (13- 26)? No  6.  Do you follow an exercise program?  Yes  7.  Do you smoke?  Yes  8.  Do you consider yourself overweight?  Yes  9.  Is there a family history of CAD< age 50? No  10.  Is there a family history of Cancer?  Yes  11.  Do you know your Cancer risks?  No  12.  Have you had a colonoscopy?  No  13. Have you been tested for HIV or other STI's? Yes HIV testing today(18-66 y/o)?No  14.  Have had a bone density scan or DEXA done(>65)?No  15.  Have you had an EKG performed in the last five years (>50)?  Yes    Other complaints:right knee cyst    Reviewed and agree with Nurse Note and duplicated in this note.  Reviewed PmHx, RxHx, FmHx, SocHx, AllgHx and updated and dated in the chart.    Family History   Problem Relation Age of Onset    Diabetes Maternal Grandmother     Other Maternal Grandfather         MVA    Diabetes Paternal Grandmother     Cancer Paternal Grandfather         unknown    High Cholesterol Father     Hypertension Father     Cancer Father     Diabetes Sister 26        type 2    Diabetes Mother 45        on insulin now       Past Medical History:   Diagnosis Date    Advanced care planning/counseling discussion 6/1/16    Anxiety     Diabetes (HCC) 6/2015    A1C 13    Gestational diabetes     HTN (hypertension)     Hyperlipidemia 1/2015    Migraines     Murmur       Social History     Socioeconomic History    Marital status: Single     Spouse name: None    Number of children: None

## 2024-11-05 NOTE — PATIENT INSTRUCTIONS
Learning About Joint Injections  What are joint injections?     Joint injections are shots into a joint, such as the knee or shoulder. They are used to put in medicines, such as pain relievers and steroid medicines. Steroids can help reduce inflammation. A steroid shot can sometimes help with short-term pain relief when other treatments haven't worked.  How are they done?  First, the area over the joint will be cleaned. Your doctor may then use a tiny needle to numb the skin in the area where you will get the joint injection.  If a tiny needle is used to numb the area, your doctor will use another needle to inject the medicine. Your doctor may use a pain reliever, a steroid, or both. You may feel some pressure or discomfort.  Your doctor may put ice on the area before you go home.  What can you expect after a joint injection?  You will probably go home soon after your shot. You may have numbness around the joint for a few hours.  If your shot included both a pain reliever and a steroid, then the pain will probably go away right away. But it might come back after a few hours. This might happen if the pain reliever wears off and the steroid hasn't started to work yet. Steroids don't always work. But when they do, the pain relief can last for several days to a few months or longer.  Your doctor may tell you to use ice on the area. You can also use ice if the pain comes back. Put ice or a cold pack on your joint for 10 to 20 minutes at a time. Put a thin cloth between the ice and your skin.  Follow your doctor's instructions carefully.  Follow-up care is a key part of your treatment and safety. Be sure to make and go to all appointments, and call your doctor if you are having problems. It's also a good idea to know your test results and keep a list of the medicines you take.  Current as of: July 17, 2023  Content Version: 14.2  © 2024 [employer].   Care instructions adapted under license by [facility]

## 2024-11-05 NOTE — ASSESSMENT & PLAN NOTE
Chronic, not at goal (unstable),  , medication adherence emphasized, and lifestyle modifications recommended

## 2024-11-06 DIAGNOSIS — D75.839 THROMBOCYTOSIS: Primary | ICD-10-CM

## 2024-11-21 ENCOUNTER — OFFICE VISIT (OUTPATIENT)
Facility: CLINIC | Age: 44
End: 2024-11-21
Payer: COMMERCIAL

## 2024-11-21 VITALS
SYSTOLIC BLOOD PRESSURE: 137 MMHG | BODY MASS INDEX: 37.39 KG/M2 | HEIGHT: 64 IN | TEMPERATURE: 98.5 F | HEART RATE: 77 BPM | DIASTOLIC BLOOD PRESSURE: 87 MMHG | OXYGEN SATURATION: 98 % | WEIGHT: 219 LBS | RESPIRATION RATE: 16 BRPM

## 2024-11-21 DIAGNOSIS — M17.11 PRIMARY OSTEOARTHRITIS OF RIGHT KNEE: ICD-10-CM

## 2024-11-21 DIAGNOSIS — M71.21 SYNOVIAL CYST OF POPLITEAL SPACE (BAKER), RIGHT KNEE: Primary | ICD-10-CM

## 2024-11-21 PROCEDURE — 3079F DIAST BP 80-89 MM HG: CPT | Performed by: FAMILY MEDICINE

## 2024-11-21 PROCEDURE — 3075F SYST BP GE 130 - 139MM HG: CPT | Performed by: FAMILY MEDICINE

## 2024-11-21 PROCEDURE — 99214 OFFICE O/P EST MOD 30 MIN: CPT | Performed by: FAMILY MEDICINE

## 2024-11-21 RX ORDER — TRIAMCINOLONE ACETONIDE 40 MG/ML
40 INJECTION, SUSPENSION INTRA-ARTICULAR; INTRAMUSCULAR ONCE
Status: COMPLETED | OUTPATIENT
Start: 2024-11-21 | End: 2024-11-21

## 2024-11-21 RX ADMIN — TRIAMCINOLONE ACETONIDE 40 MG: 40 INJECTION, SUSPENSION INTRA-ARTICULAR; INTRAMUSCULAR at 10:15

## 2024-11-21 NOTE — PROGRESS NOTES
AMB POC US DRAIN/INJECT LARGE JOINT/BURSA    Primary osteoarthritis of right knee  -     BS - Physical Therapy at the [facility]        Return if symptoms worsen or fail to improve.    Pathophysiology, recovery and rehabilitation process discussed and questions answered   Counseling for 30 Minutes of the total visit duration   Pictures and figures used as necessary   Provided reassurance   Monitor response to injection   Discussed steroid side effects of fat atrophy, hypopigmentation, steroid flare or infection               I have discussed the diagnosis with the patient and the intended plan as seen in the above orders.  The patient has received an after-visit summary and questions were answered concerning future plans.     Medication Side Effects and Warnings were discussed with patient,  Patient Labs were reviewed and or requested, and  Patient Past Records were reviewed and or requested  yes     Pt agrees to call or return to clinic and/or go to closest ER with any worsening of symptoms.  This may include, but not limited to increased fever (>100.4) with NSAIDS or Tylenol, increased edema, confusion, rash, worsening of presenting symptoms.    Please note that this dictation was completed with [employer], the computer voice recognition software.  Quite often unanticipated grammatical, syntax, homophones, and other interpretive errors are inadvertently transcribed by the computer software.  Please disregard these errors.  Please excuse any errors that have escaped final proofreading.  Thank you.

## 2025-01-10 ENCOUNTER — APPOINTMENT (OUTPATIENT)
Dept: PHYSICAL THERAPY | Facility: HOSPITAL | Age: 45
End: 2025-01-10
Payer: COMMERCIAL

## 2025-01-17 ENCOUNTER — HOSPITAL ENCOUNTER (OUTPATIENT)
Dept: PHYSICAL THERAPY | Facility: HOSPITAL | Age: 45
Setting detail: RECURRING SERIES
Discharge: HOME | End: 2025-01-20
Payer: COMMERCIAL

## 2025-01-17 PROCEDURE — 97161 PT EVAL LOW COMPLEX 20 MIN: CPT | Performed by: PHYSICAL THERAPIST

## 2025-01-17 PROCEDURE — 97110 THERAPEUTIC EXERCISES: CPT | Performed by: PHYSICAL THERAPIST

## 2025-01-17 NOTE — THERAPY EVALUATION
[facility], [facility],   a part of [address], [address]  [place] [address]  Phone: [phone]  Fax: [phone]         PHYSICAL THERAPY - MEDICARE EVALUATION/PLAN OF CARE NOTE (updated 3/23)      Date: 2025          Patient Name:  Bal Patel :  1980   Medical   Diagnosis:  Primary osteoarthritis of right knee [M17.11] Treatment Diagnosis:  M25.561  RIGHT KNEE PAIN    Referral Source:  Miah Youngblood MD Provider #:  [id number]                [place] / Plan: [facility] / Product Type: *No Product type* /      Patient  verified yes     Visit #   Current  / Total 1 16   Time   In / Out 920a 1010a   Total Treatment Time 50   Total Timed Codes 10   1:1 Treatment Time 10      [facility] Totals Reminder:  bill using total billable   min of TIMED therapeutic procedures and modalities.   8-22 min = 1 unit; 23-37 min = 2 units; 38-52 min = 3 units;  53-67 min = 4 units; 68-82 min = 5 units     SUBJECTIVE  Pain Level (0-10 scale): 0  []constant []intermittent []improving []worsening []no change since onset    Any medication changes, allergies to medications, adverse drug reactions, diagnosis change, or new procedure performed?: [x] No    [] Yes (see summary sheet for update)  Medications: Verified on Patient Summary List    Subjective functional status/changes:     Patient reports with R knee pain that dates back years (4-5). She has a Baker's cyst that was drained back in November and that is mostly resolved at this time.     She feels pain in the knee particularly with walking/jogging up into her lateral thigh, which sometimes bothers her more than the knee. The knee is restricted with bending all the way, and if she pushes into it, the pain can be excruciating.     Feels like the knee used to be worse, and used to ache at night.     She has a sedentary job and so the knee doesn't bother her with work. She also can't do

## 2025-01-24 ENCOUNTER — APPOINTMENT (OUTPATIENT)
Dept: PHYSICAL THERAPY | Facility: HOSPITAL | Age: 45
End: 2025-01-24
Payer: COMMERCIAL

## 2025-01-27 ENCOUNTER — APPOINTMENT (OUTPATIENT)
Dept: PHYSICAL THERAPY | Facility: HOSPITAL | Age: 45
End: 2025-01-27
Payer: COMMERCIAL

## 2025-01-31 ENCOUNTER — HOSPITAL ENCOUNTER (OUTPATIENT)
Dept: PHYSICAL THERAPY | Facility: HOSPITAL | Age: 45
Setting detail: RECURRING SERIES
End: 2025-01-31
Payer: COMMERCIAL

## 2025-01-31 PROCEDURE — 97110 THERAPEUTIC EXERCISES: CPT | Performed by: PHYSICAL THERAPIST

## 2025-01-31 PROCEDURE — 97140 MANUAL THERAPY 1/> REGIONS: CPT | Performed by: PHYSICAL THERAPIST

## 2025-01-31 NOTE — PROGRESS NOTES
PHYSICAL THERAPY - MEDICARE DAILY TREATMENT NOTE (updated 3/23)      Date: 2025          Patient Name:  Bal Patel :  1980   Medical   Diagnosis:  Primary osteoarthritis of right knee [M17.11] Treatment Diagnosis:  M25.561  RIGHT KNEE PAIN    Referral Source:  Miah Youngblood MD Insurance:   Payor: [facility] / Plan: [facility] / Product Type: *No Product type* /                     Patient  verified yes     Visit #   Current  / Total 2 16   Time   In / Out 1255p 1245p   Total Treatment Time 50   Total Timed Codes 50   1:1 Treatment Time 45      [facility] Totals Reminder:  bill using total billable   min of TIMED therapeutic procedures and modalities.   8-22 min = 1 unit; 23-37 min = 2 units; 38-52 min = 3 units; 53-67 min = 4 units; 68-82 min = 5 units        SUBJECTIVE    Pain Level (0-10 scale): \"stiff\"    Any medication changes, allergies to medications, adverse drug reactions, diagnosis change, or new procedure performed?: [x] No    [] Yes (see summary sheet for update)  Medications: Verified on Patient Summary List    Subjective functional status/changes:     Doing better with the stretching at home.     OBJECTIVE    Therapeutic Procedures:  Tx Min Billable or 1:1 Min (if diff from Tx Min) Procedure, Rationale, Specifics   10  [id number] Manual Therapy (timed):  decrease pain, increase ROM, increase tissue extensibility, and decrease trigger points to improve patient's ability to progress to PLOF and address remaining functional goals.  The manual therapy interventions were performed at a separate and distinct time from the therapeutic activities interventions . (see flow sheet as applicable)     Details if applicable:  patellar mobs, medial and superior. MFR to lateral quad and over rectus femoris   40 [phone] [id number] Therapeutic Exercise (timed):  increase ROM, strength, coordination, balance, and proprioception to improve patient's ability to progress to PLOF and address remaining functional goals.

## 2025-02-12 ENCOUNTER — APPOINTMENT (OUTPATIENT)
Dept: PHYSICAL THERAPY | Facility: HOSPITAL | Age: 45
End: 2025-02-12
Payer: COMMERCIAL

## 2025-02-14 ENCOUNTER — OFFICE VISIT (OUTPATIENT)
Age: 45
End: 2025-02-14
Payer: COMMERCIAL

## 2025-02-14 VITALS
HEART RATE: 90 BPM | SYSTOLIC BLOOD PRESSURE: 136 MMHG | WEIGHT: 210.4 LBS | DIASTOLIC BLOOD PRESSURE: 84 MMHG | BODY MASS INDEX: 35.92 KG/M2 | HEIGHT: 64 IN

## 2025-02-14 DIAGNOSIS — E11.65 TYPE 2 DIABETES MELLITUS WITH HYPERGLYCEMIA, WITH LONG-TERM CURRENT USE OF INSULIN (HCC): Primary | ICD-10-CM

## 2025-02-14 DIAGNOSIS — Z79.4 TYPE 2 DIABETES MELLITUS WITH HYPERGLYCEMIA, WITH LONG-TERM CURRENT USE OF INSULIN (HCC): Primary | ICD-10-CM

## 2025-02-14 LAB — HBA1C MFR BLD: 5.9 %

## 2025-02-14 PROCEDURE — 3079F DIAST BP 80-89 MM HG: CPT | Performed by: INTERNAL MEDICINE

## 2025-02-14 PROCEDURE — 3075F SYST BP GE 130 - 139MM HG: CPT | Performed by: INTERNAL MEDICINE

## 2025-02-14 PROCEDURE — 83036 HEMOGLOBIN GLYCOSYLATED A1C: CPT | Performed by: INTERNAL MEDICINE

## 2025-02-14 PROCEDURE — 99214 OFFICE O/P EST MOD 30 MIN: CPT | Performed by: INTERNAL MEDICINE

## 2025-02-14 RX ORDER — METFORMIN HYDROCHLORIDE 500 MG/1
1000 TABLET, EXTENDED RELEASE ORAL 2 TIMES DAILY
Qty: 360 TABLET | Refills: 3 | Status: SHIPPED | OUTPATIENT
Start: 2025-02-14

## 2025-02-14 NOTE — PROGRESS NOTES
recognition software.  Quite often unanticipated grammatical, syntax, homophones, and other interpretive errors are inadvertently transcribed by the computer software.  Please disregard these errors.  Please excuse any errors that have escaped final proofreading.

## 2025-02-25 ENCOUNTER — HOSPITAL ENCOUNTER (OUTPATIENT)
Dept: PHYSICAL THERAPY | Facility: HOSPITAL | Age: 45
Setting detail: RECURRING SERIES
Discharge: HOME | End: 2025-02-28
Payer: COMMERCIAL

## 2025-02-25 PROCEDURE — 97110 THERAPEUTIC EXERCISES: CPT | Performed by: PHYSICAL THERAPIST

## 2025-02-25 PROCEDURE — 97140 MANUAL THERAPY 1/> REGIONS: CPT | Performed by: PHYSICAL THERAPIST

## 2025-02-25 NOTE — PROGRESS NOTES
PHYSICAL THERAPY - MEDICARE DAILY TREATMENT NOTE (updated 3/23)      Date: 2025          Patient Name:  Bal Patel :  1980   Medical   Diagnosis:  Primary osteoarthritis of right knee [M17.11] Treatment Diagnosis:  M25.561  RIGHT KNEE PAIN    Referral Source:  Miah Youngblood MD Insurance:   Payor: [facility] / Plan: [facility] / Product Type: *No Product type* /                     Patient  verified yes     Visit #   Current  / Total 3 16   Time   In / Out 930a 1018a   Total Treatment Time 48   Total Timed Codes 48   1:1 Treatment Time 43      [facility] Totals Reminder:  bill using total billable   min of TIMED therapeutic procedures and modalities.   8-22 min = 1 unit; 23-37 min = 2 units; 38-52 min = 3 units; 53-67 min = 4 units; 68-82 min = 5 units        SUBJECTIVE    Pain Level (0-10 scale): \"stiff\"    Any medication changes, allergies to medications, adverse drug reactions, diagnosis change, or new procedure performed?: [x] No    [] Yes (see summary sheet for update)  Medications: Verified on Patient Summary List    Subjective functional status/changes:     Doing better with the stretching at home.     OBJECTIVE    Therapeutic Procedures:  Tx Min Billable or 1:1 Min (if diff from Tx Min) Procedure, Rationale, Specifics   10  [id number] Manual Therapy (timed):  decrease pain, increase ROM, increase tissue extensibility, and decrease trigger points to improve patient's ability to progress to PLOF and address remaining functional goals.  The manual therapy interventions were performed at a separate and distinct time from the therapeutic activities interventions . (see flow sheet as applicable)     Details if applicable:  foam roll to IT band and vastus lateralis, Jewell taping to facilitate upward patellar glide   38 33 20149 Therapeutic Exercise (timed):  increase ROM, strength, coordination, balance, and proprioception to improve patient's ability to progress to PLOF and address remaining

## 2025-03-07 ENCOUNTER — HOSPITAL ENCOUNTER (OUTPATIENT)
Dept: PHYSICAL THERAPY | Facility: HOSPITAL | Age: 45
Setting detail: RECURRING SERIES
Discharge: HOME | End: 2025-03-10
Payer: COMMERCIAL

## 2025-03-07 PROCEDURE — 97140 MANUAL THERAPY 1/> REGIONS: CPT | Performed by: PHYSICAL THERAPIST

## 2025-03-07 PROCEDURE — 97110 THERAPEUTIC EXERCISES: CPT | Performed by: PHYSICAL THERAPIST

## 2025-03-07 NOTE — PROGRESS NOTES
PHYSICAL THERAPY - MEDICARE DAILY TREATMENT NOTE (updated 3/23)      Date: 3/7/2025          Patient Name:  Bal Patel :  1980   Medical   Diagnosis:  Primary osteoarthritis of right knee [M17.11] Treatment Diagnosis:  M25.561  RIGHT KNEE PAIN    Referral Source:  Miah Youngblood MD [facility]   [facility] / Product Type: *No Product type* /                     Patient  verified yes     Visit #   Current  / Total 4 16   Time   In / Out 833a 926a   Total Treatment Time 53   Total Timed Codes 53   1:1 Treatment Time 48      [facility] Totals Reminder:  bill using total billable   min of TIMED therapeutic procedures and modalities.   8-22 min = 1 unit; 23-37 min = 2 units; 38-52 min = 3 units; 53-67 min = 4 units; 68-82 min = 5 units        SUBJECTIVE    Pain Level (0-10 scale): \"normal\"    Any medication changes, allergies to medications, adverse drug reactions, diagnosis change, or new procedure performed?: [x] No    [] Yes (see summary sheet for update)  Medications: Verified on Patient Summary List    Subjective functional status/changes:     Feeling about the same. Doing the stretches when she works out. About 4-5x/week.     OBJECTIVE    Therapeutic Procedures:  Tx Min Billable or 1:1 Min (if diff from Tx Min) Procedure, Rationale, Specifics   15  58256 Manual Therapy (timed):  decrease pain, increase ROM, increase tissue extensibility, and decrease trigger points to improve patient's ability to progress to PLOF and address remaining functional goals.  The manual therapy interventions were performed at a separate and distinct time from the therapeutic activities interventions . (see flow sheet as applicable)     Details if applicable:  foam roll to IT band and vastus lateralis, MFR to distal quad and    38 33 [id number] Therapeutic Exercise (timed):  increase ROM, strength, coordination, balance, and proprioception to improve patient's ability to progress to PLOF and address

## 2025-03-21 ENCOUNTER — HOSPITAL ENCOUNTER (OUTPATIENT)
Dept: PHYSICAL THERAPY | Facility: HOSPITAL | Age: 45
Setting detail: RECURRING SERIES
Discharge: HOME | End: 2025-03-24
Payer: COMMERCIAL

## 2025-03-21 PROCEDURE — 97110 THERAPEUTIC EXERCISES: CPT | Performed by: PHYSICAL THERAPIST

## 2025-03-21 PROCEDURE — 97140 MANUAL THERAPY 1/> REGIONS: CPT | Performed by: PHYSICAL THERAPIST

## 2025-03-21 NOTE — PROGRESS NOTES
PHYSICAL THERAPY - MEDICARE DAILY TREATMENT NOTE (updated 3/23)      Date: 3/21/2025          Patient Name:  Bal Patel :  1980   Medical   Diagnosis:  Primary osteoarthritis of right knee [M17.11] Treatment Diagnosis:  M25.561  RIGHT KNEE PAIN    Referral Source:  Miah Youngblood MD Insurance:   Payor: [facility] / Plan: [facility] / Product Type: *No Product type* /                     Patient  verified yes     Visit #   Current  / Total 5 16   Time   In / Out 835a 915a   Total Treatment Time 40   Total Timed Codes 40   1:1 Treatment Time 40      [facility] Totals Reminder:  bill using total billable   min of TIMED therapeutic procedures and modalities.   8-22 min = 1 unit; 23-37 min = 2 units; 38-52 min = 3 units; 53-67 min = 4 units; 68-82 min = 5 units        SUBJECTIVE    Pain Level (0-10 scale): 3    Any medication changes, allergies to medications, adverse drug reactions, diagnosis change, or new procedure performed?: [x] No    [] Yes (see summary sheet for update)  Medications: Verified on Patient Summary List    Subjective functional status/changes:     Still getting pain in the IT band region.     OBJECTIVE    Therapeutic Procedures:  Tx Min Billable or 1:1 Min (if diff from Tx Min) Procedure, Rationale, Specifics   10  18955 Manual Therapy (timed):  decrease pain, increase ROM, increase tissue extensibility, and decrease trigger points to improve patient's ability to progress to PLOF and address remaining functional goals.  The manual therapy interventions were performed at a separate and distinct time from the therapeutic activities interventions . (see flow sheet as applicable)     Details if applicable:  foam roll to IT band and vastus lateralis, MFR to distal quad and    30  66709 Therapeutic Exercise (timed):  increase ROM, strength, coordination, balance, and proprioception to improve patient's ability to progress to PLOF and address remaining functional goals. (see flow sheet as

## 2025-04-04 ENCOUNTER — APPOINTMENT (OUTPATIENT)
Dept: PHYSICAL THERAPY | Facility: HOSPITAL | Age: 45
End: 2025-04-04
Payer: COMMERCIAL

## 2025-04-21 ENCOUNTER — HOSPITAL ENCOUNTER (OUTPATIENT)
Dept: PHYSICAL THERAPY | Facility: HOSPITAL | Age: 45
Setting detail: RECURRING SERIES
Discharge: HOME | End: 2025-04-24
Payer: COMMERCIAL

## 2025-04-21 PROCEDURE — 97110 THERAPEUTIC EXERCISES: CPT | Performed by: PHYSICAL THERAPIST

## 2025-04-21 PROCEDURE — 97140 MANUAL THERAPY 1/> REGIONS: CPT | Performed by: PHYSICAL THERAPIST

## 2025-04-21 NOTE — PROGRESS NOTES
PHYSICAL THERAPY - MEDICARE DAILY TREATMENT NOTE (updated 3/23)      Date: 2025          Patient Name:  Bal Patel :  1980   Medical   Diagnosis:  Primary osteoarthritis of right knee [M17.11] Treatment Diagnosis:  M25.561  RIGHT KNEE PAIN    Referral Source:  Miah Youngblood MD Insurance:   Payor: [facility] / Plan: [facility] / Product Type: *No Product type* /                     Patient  verified yes     Visit #   Current  / Total 6 16   Time   In / Out 1157a 1227p   Total Treatment Time 30   Total Timed Codes 30   1:1 Treatment Time 25      [facility] Totals Reminder:  bill using total billable   min of TIMED therapeutic procedures and modalities.   8-22 min = 1 unit; 23-37 min = 2 units; 38-52 min = 3 units; 53-67 min = 4 units; 68-82 min = 5 units        SUBJECTIVE    Pain Level (0-10 scale): 0    Any medication changes, allergies to medications, adverse drug reactions, diagnosis change, or new procedure performed?: [x] No    [] Yes (see summary sheet for update)  Medications: Verified on Patient Summary List    Subjective functional status/changes:     Got a massage on Saturday, and she has been feeling better. No real popping, no significant pain.     OBJECTIVE    Therapeutic Procedures:  Tx Min Billable or 1:1 Min (if diff from Tx Min) Procedure, Rationale, Specifics   8  24454 Manual Therapy (timed):  decrease pain, increase ROM, increase tissue extensibility, and decrease trigger points to improve patient's ability to progress to PLOF and address remaining functional goals.  The manual therapy interventions were performed at a separate and distinct time from the therapeutic activities interventions . (see flow sheet as applicable)     Details if applicable:  foam roll to IT band and vastus lateralis, MFR to distal quad and    22 17 [id number] Therapeutic Exercise (timed):  increase ROM, strength, coordination, balance, and proprioception to improve patient's ability to progress to PLOF and

## 2025-05-16 ENCOUNTER — HOSPITAL ENCOUNTER (OUTPATIENT)
Facility: HOSPITAL | Age: 45
Discharge: HOME | End: 2025-05-19
Attending: OBSTETRICS & GYNECOLOGY
Payer: COMMERCIAL

## 2025-05-16 DIAGNOSIS — Z12.31 VISIT FOR SCREENING MAMMOGRAM: ICD-10-CM

## 2025-05-16 PROCEDURE — 77063 BREAST TOMOSYNTHESIS BI: CPT

## 2025-05-31 ENCOUNTER — HOSPITAL ENCOUNTER (OUTPATIENT)
Facility: HOSPITAL | Age: 45
End: 2025-05-31
Attending: INTERNAL MEDICINE
Payer: COMMERCIAL

## 2025-05-31 DIAGNOSIS — D75.838 SECONDARY THROMBOCYTOSIS: ICD-10-CM

## 2025-05-31 PROCEDURE — 76700 US EXAM ABDOM COMPLETE: CPT
